# Patient Record
Sex: FEMALE | Race: WHITE | NOT HISPANIC OR LATINO | Employment: UNEMPLOYED | URBAN - METROPOLITAN AREA
[De-identification: names, ages, dates, MRNs, and addresses within clinical notes are randomized per-mention and may not be internally consistent; named-entity substitution may affect disease eponyms.]

---

## 2017-01-02 ENCOUNTER — HISTORICAL (OUTPATIENT)
Dept: ADMINISTRATIVE | Facility: HOSPITAL | Age: 82
End: 2017-01-02

## 2017-01-02 LAB
ALBUMIN SERPL-MCNC: 2.5 G/DL (ref 3.1–4.7)
ALP SERPL-CCNC: 105 IU/L (ref 40–104)
ALT (SGPT): 29 IU/L (ref 3–33)
AST SERPL-CCNC: 21 IU/L (ref 10–40)
BILIRUB SERPL-MCNC: 1.2 MG/DL (ref 0.3–1)
BUN SERPL-MCNC: 17 MG/DL (ref 8–20)
CALCIUM SERPL-MCNC: 8.6 MG/DL (ref 7.7–10.4)
CHLORIDE: 99 MMOL/L (ref 98–110)
CO2 SERPL-SCNC: 23.7 MMOL/L (ref 22.8–31.6)
CREATININE: 1.03 MG/DL (ref 0.6–1.4)
GLUCOSE: 214 MG/DL (ref 70–99)
POTASSIUM SERPL-SCNC: 3.4 MMOL/L (ref 3.5–5)
PROT SERPL-MCNC: 6 G/DL (ref 6–8.2)
SODIUM: 132 MMOL/L (ref 134–144)

## 2017-01-06 ENCOUNTER — HOSPITAL ENCOUNTER (OUTPATIENT)
Facility: HOSPITAL | Age: 82
Discharge: HOME | End: 2017-01-08
Attending: INTERNAL MEDICINE | Admitting: INTERNAL MEDICINE
Payer: MEDICARE

## 2017-01-06 DIAGNOSIS — K83.1 PANCREATITIS DUE TO BILIARY OBSTRUCTION: Primary | ICD-10-CM

## 2017-01-06 DIAGNOSIS — E83.42 HYPOMAGNESEMIA: ICD-10-CM

## 2017-01-06 DIAGNOSIS — R74.01 TRANSAMINITIS: ICD-10-CM

## 2017-01-06 DIAGNOSIS — I10 ESSENTIAL HYPERTENSION: ICD-10-CM

## 2017-01-06 DIAGNOSIS — K85.90 PANCREATITIS DUE TO BILIARY OBSTRUCTION: Primary | ICD-10-CM

## 2017-01-06 DIAGNOSIS — K85.10 ACUTE BILIARY PANCREATITIS, UNSPECIFIED COMPLICATION STATUS: ICD-10-CM

## 2017-01-06 DIAGNOSIS — R10.9 ABDOMINAL PAIN, UNSPECIFIED LOCATION: ICD-10-CM

## 2017-01-06 DIAGNOSIS — N18.2 CKD (CHRONIC KIDNEY DISEASE) STAGE 2, GFR 60-89 ML/MIN: ICD-10-CM

## 2017-01-06 DIAGNOSIS — R17 TOTAL BILIRUBIN, ELEVATED: ICD-10-CM

## 2017-01-06 DIAGNOSIS — E83.39 HYPOPHOSPHATEMIA: ICD-10-CM

## 2017-01-06 DIAGNOSIS — D50.0 IRON DEFICIENCY ANEMIA DUE TO CHRONIC BLOOD LOSS: ICD-10-CM

## 2017-01-06 DIAGNOSIS — I25.10 CORONARY ARTERY DISEASE, ANGINA PRESENCE UNSPECIFIED, UNSPECIFIED VESSEL OR LESION TYPE, UNSPECIFIED WHETHER NATIVE OR TRANSPLANTED HEART: ICD-10-CM

## 2017-01-06 DIAGNOSIS — I50.9 HEART FAILURE OF UNKNOWN TYPE: ICD-10-CM

## 2017-01-06 DIAGNOSIS — J44.9 CHRONIC OBSTRUCTIVE PULMONARY DISEASE, UNSPECIFIED COPD TYPE: ICD-10-CM

## 2017-01-06 DIAGNOSIS — D64.9 NORMOCYTIC ANEMIA: ICD-10-CM

## 2017-01-06 PROBLEM — Z95.810 AICD (AUTOMATIC CARDIOVERTER/DEFIBRILLATOR) PRESENT: Status: ACTIVE | Noted: 2017-01-06

## 2017-01-06 PROBLEM — R32 URINARY INCONTINENCE: Status: ACTIVE | Noted: 2017-01-06

## 2017-01-06 PROBLEM — R19.7 DIARRHEA: Status: ACTIVE | Noted: 2017-01-06

## 2017-01-06 PROBLEM — K22.5 ZENKER DIVERTICULUM: Status: ACTIVE | Noted: 2017-01-06

## 2017-01-06 PROBLEM — D72.829 LEUKOCYTOSIS: Status: ACTIVE | Noted: 2017-01-06

## 2017-01-06 PROBLEM — I34.0 MITRAL REGURGITATION: Status: ACTIVE | Noted: 2017-01-06

## 2017-01-06 PROBLEM — N36.1 URETHRAL DIVERTICULUM: Status: ACTIVE | Noted: 2017-01-06

## 2017-01-06 PROBLEM — Z98.890 H/O COLONOSCOPY WITH POLYPECTOMY: Status: ACTIVE | Noted: 2017-01-06

## 2017-01-06 PROBLEM — D50.9 IDA (IRON DEFICIENCY ANEMIA): Status: ACTIVE | Noted: 2017-01-06

## 2017-01-06 PROBLEM — Z86.010 H/O COLONOSCOPY WITH POLYPECTOMY: Status: ACTIVE | Noted: 2017-01-06

## 2017-01-06 PROBLEM — I42.9 CARDIOMYOPATHY: Status: ACTIVE | Noted: 2017-01-06

## 2017-01-06 PROBLEM — D75.839 THROMBOCYTOSIS: Status: ACTIVE | Noted: 2017-01-06

## 2017-01-06 PROBLEM — K59.00 CONSTIPATION: Status: ACTIVE | Noted: 2017-01-06

## 2017-01-06 PROBLEM — R32 URINARY INCONTINENCE: Status: RESOLVED | Noted: 2017-01-06 | Resolved: 2017-01-06

## 2017-01-06 PROBLEM — J45.909 ASTHMA: Status: ACTIVE | Noted: 2017-01-06

## 2017-01-06 PROBLEM — H40.9 GLAUCOMA: Status: ACTIVE | Noted: 2017-01-06

## 2017-01-06 PROBLEM — R73.9 HYPERGLYCEMIA: Status: ACTIVE | Noted: 2017-01-06

## 2017-01-06 PROBLEM — I44.7 LBBB (LEFT BUNDLE BRANCH BLOCK): Status: ACTIVE | Noted: 2017-01-06

## 2017-01-06 LAB
ALBUMIN SERPL BCP-MCNC: 2.4 G/DL
ALP SERPL-CCNC: 330 U/L
ALT SERPL W/O P-5'-P-CCNC: 135 U/L
ANION GAP SERPL CALC-SCNC: 10 MMOL/L
ANISOCYTOSIS BLD QL SMEAR: SLIGHT
AST SERPL-CCNC: 116 U/L
BASOPHILS # BLD AUTO: ABNORMAL K/UL
BASOPHILS NFR BLD: 0 %
BILIRUB SERPL-MCNC: 5.5 MG/DL
BNP SERPL-MCNC: 139 PG/ML
BUN SERPL-MCNC: 13 MG/DL
CALCIUM SERPL-MCNC: 9.4 MG/DL
CHLORIDE SERPL-SCNC: 98 MMOL/L
CO2 SERPL-SCNC: 27 MMOL/L
CREAT SERPL-MCNC: 1 MG/DL
DIFFERENTIAL METHOD: ABNORMAL
EOSINOPHIL # BLD AUTO: ABNORMAL K/UL
EOSINOPHIL NFR BLD: 0 %
ERYTHROCYTE [DISTWIDTH] IN BLOOD BY AUTOMATED COUNT: 18.6 %
EST. GFR  (AFRICAN AMERICAN): 59 ML/MIN/1.73 M^2
EST. GFR  (NON AFRICAN AMERICAN): 51 ML/MIN/1.73 M^2
GLUCOSE SERPL-MCNC: 303 MG/DL
HCT VFR BLD AUTO: 32 %
HGB BLD-MCNC: 10.5 G/DL
HYPOCHROMIA BLD QL SMEAR: ABNORMAL
INR PPP: 1.1
LYMPHOCYTES # BLD AUTO: ABNORMAL K/UL
LYMPHOCYTES NFR BLD: 4 %
MAGNESIUM SERPL-MCNC: 1.5 MG/DL
MCH RBC QN AUTO: 27.1 PG
MCHC RBC AUTO-ENTMCNC: 32.8 %
MCV RBC AUTO: 83 FL
MONOCYTES # BLD AUTO: ABNORMAL K/UL
MONOCYTES NFR BLD: 2 %
NEUTROPHILS NFR BLD: 94 %
PHOSPHATE SERPL-MCNC: 2.7 MG/DL
PLATELET # BLD AUTO: 417 K/UL
PLATELET BLD QL SMEAR: ABNORMAL
PMV BLD AUTO: 9.8 FL
POTASSIUM SERPL-SCNC: 3.6 MMOL/L
PROT SERPL-MCNC: 6.4 G/DL
PROTHROMBIN TIME: 11.4 SEC
RBC # BLD AUTO: 3.87 M/UL
SODIUM SERPL-SCNC: 135 MMOL/L
TROPONIN I SERPL DL<=0.01 NG/ML-MCNC: 0.05 NG/ML
WBC # BLD AUTO: 14.66 K/UL

## 2017-01-06 PROCEDURE — 82607 VITAMIN B-12: CPT

## 2017-01-06 PROCEDURE — 85007 BL SMEAR W/DIFF WBC COUNT: CPT

## 2017-01-06 PROCEDURE — 83540 ASSAY OF IRON: CPT

## 2017-01-06 PROCEDURE — 80053 COMPREHEN METABOLIC PANEL: CPT

## 2017-01-06 PROCEDURE — 85027 COMPLETE CBC AUTOMATED: CPT

## 2017-01-06 PROCEDURE — 25000003 PHARM REV CODE 250: Performed by: HOSPITALIST

## 2017-01-06 PROCEDURE — G0378 HOSPITAL OBSERVATION PER HR: HCPCS

## 2017-01-06 PROCEDURE — 83735 ASSAY OF MAGNESIUM: CPT

## 2017-01-06 PROCEDURE — 85610 PROTHROMBIN TIME: CPT

## 2017-01-06 PROCEDURE — 84466 ASSAY OF TRANSFERRIN: CPT

## 2017-01-06 PROCEDURE — 84100 ASSAY OF PHOSPHORUS: CPT

## 2017-01-06 PROCEDURE — 84484 ASSAY OF TROPONIN QUANT: CPT

## 2017-01-06 PROCEDURE — 83880 ASSAY OF NATRIURETIC PEPTIDE: CPT

## 2017-01-06 PROCEDURE — 82746 ASSAY OF FOLIC ACID SERUM: CPT

## 2017-01-06 PROCEDURE — 82728 ASSAY OF FERRITIN: CPT

## 2017-01-06 PROCEDURE — G0379 DIRECT REFER HOSPITAL OBSERV: HCPCS

## 2017-01-06 PROCEDURE — 36415 COLL VENOUS BLD VENIPUNCTURE: CPT

## 2017-01-06 PROCEDURE — 25000003 PHARM REV CODE 250

## 2017-01-06 PROCEDURE — 93005 ELECTROCARDIOGRAM TRACING: CPT

## 2017-01-06 RX ORDER — LATANOPROST 50 UG/ML
1 SOLUTION/ DROPS OPHTHALMIC NIGHTLY
Status: DISCONTINUED | OUTPATIENT
Start: 2017-01-06 | End: 2017-01-08 | Stop reason: HOSPADM

## 2017-01-06 RX ORDER — HEPARIN SODIUM 5000 [USP'U]/ML
5000 INJECTION, SOLUTION INTRAVENOUS; SUBCUTANEOUS EVERY 12 HOURS
Status: DISCONTINUED | OUTPATIENT
Start: 2017-01-06 | End: 2017-01-07

## 2017-01-06 RX ORDER — AMLODIPINE BESYLATE 5 MG/1
5 TABLET ORAL DAILY
Status: DISCONTINUED | OUTPATIENT
Start: 2017-01-07 | End: 2017-01-06

## 2017-01-06 RX ORDER — LATANOPROST 50 UG/ML
1 SOLUTION/ DROPS OPHTHALMIC NIGHTLY
COMMUNITY

## 2017-01-06 RX ORDER — UBIDECARENONE 30 MG
100 CAPSULE ORAL DAILY
Status: ON HOLD | COMMUNITY
End: 2019-02-06 | Stop reason: ALTCHOICE

## 2017-01-06 RX ORDER — OXYBUTYNIN CHLORIDE 5 MG/1
5 TABLET, EXTENDED RELEASE ORAL DAILY
Status: DISCONTINUED | OUTPATIENT
Start: 2017-01-07 | End: 2017-01-08 | Stop reason: HOSPADM

## 2017-01-06 RX ORDER — AMLODIPINE BESYLATE 5 MG/1
5 TABLET ORAL DAILY
Status: DISCONTINUED | OUTPATIENT
Start: 2017-01-06 | End: 2017-01-08 | Stop reason: HOSPADM

## 2017-01-06 RX ORDER — CHOLECALCIFEROL (VITAMIN D3) 10 MCG
2000 TABLET ORAL DAILY
Status: DISCONTINUED | OUTPATIENT
Start: 2017-01-06 | End: 2017-01-07

## 2017-01-06 RX ORDER — ERGOCALCIFEROL 1.25 MG/1
50000 CAPSULE ORAL
COMMUNITY

## 2017-01-06 RX ORDER — TALC
3 POWDER (GRAM) TOPICAL NIGHTLY
Status: ON HOLD | COMMUNITY
End: 2019-02-06 | Stop reason: CLARIF

## 2017-01-06 RX ORDER — TAMSULOSIN HYDROCHLORIDE 0.4 MG/1
0.4 CAPSULE ORAL DAILY
Status: ON HOLD | COMMUNITY
End: 2019-02-08 | Stop reason: HOSPADM

## 2017-01-06 RX ORDER — FAMOTIDINE 20 MG/1
20 TABLET, FILM COATED ORAL 2 TIMES DAILY
Status: DISCONTINUED | OUTPATIENT
Start: 2017-01-06 | End: 2017-01-08 | Stop reason: HOSPADM

## 2017-01-06 RX ORDER — ACETAMINOPHEN 500 MG
2000 TABLET ORAL DAILY
COMMUNITY

## 2017-01-06 RX ORDER — RAMIPRIL 2.5 MG/1
5 CAPSULE ORAL DAILY
Status: DISCONTINUED | OUTPATIENT
Start: 2017-01-07 | End: 2017-01-06

## 2017-01-06 RX ORDER — NALBUPHINE HYDROCHLORIDE 20 MG/ML
5 INJECTION, SOLUTION INTRAMUSCULAR; INTRAVENOUS; SUBCUTANEOUS
Status: DISCONTINUED | OUTPATIENT
Start: 2017-01-06 | End: 2017-01-08 | Stop reason: HOSPADM

## 2017-01-06 RX ORDER — TAMSULOSIN HYDROCHLORIDE 0.4 MG/1
0.4 CAPSULE ORAL DAILY
Status: DISCONTINUED | OUTPATIENT
Start: 2017-01-06 | End: 2017-01-07

## 2017-01-06 RX ORDER — FAMOTIDINE 20 MG/1
20 TABLET, FILM COATED ORAL 2 TIMES DAILY
COMMUNITY

## 2017-01-06 RX ORDER — CETIRIZINE HYDROCHLORIDE 5 MG/1
5 TABLET ORAL DAILY
Status: DISCONTINUED | OUTPATIENT
Start: 2017-01-07 | End: 2017-01-08 | Stop reason: HOSPADM

## 2017-01-06 RX ORDER — GLUCAGON 1 MG
1 KIT INJECTION
Status: DISCONTINUED | OUTPATIENT
Start: 2017-01-07 | End: 2017-01-08 | Stop reason: HOSPADM

## 2017-01-06 RX ORDER — BRINZOLAMIDE 10 MG/ML
1 SUSPENSION/ DROPS OPHTHALMIC 2 TIMES DAILY
COMMUNITY

## 2017-01-06 RX ORDER — RAMIPRIL 2.5 MG/1
2.5 CAPSULE ORAL
COMMUNITY

## 2017-01-06 RX ORDER — MAGNESIUM SULFATE HEPTAHYDRATE 40 MG/ML
2 INJECTION, SOLUTION INTRAVENOUS ONCE
Status: COMPLETED | OUTPATIENT
Start: 2017-01-06 | End: 2017-01-07

## 2017-01-06 RX ORDER — RAMIPRIL 2.5 MG/1
5 CAPSULE ORAL DAILY
Status: DISCONTINUED | OUTPATIENT
Start: 2017-01-06 | End: 2017-01-07

## 2017-01-06 RX ORDER — INSULIN ASPART 100 [IU]/ML
0-5 INJECTION, SOLUTION INTRAVENOUS; SUBCUTANEOUS EVERY 6 HOURS PRN
Status: DISCONTINUED | OUTPATIENT
Start: 2017-01-07 | End: 2017-01-08 | Stop reason: HOSPADM

## 2017-01-06 RX ORDER — BRINZOLAMIDE 10 MG/ML
1 SUSPENSION/ DROPS OPHTHALMIC 2 TIMES DAILY
Status: DISCONTINUED | OUTPATIENT
Start: 2017-01-06 | End: 2017-01-08 | Stop reason: HOSPADM

## 2017-01-06 RX ORDER — DOCUSATE SODIUM 100 MG/1
100 CAPSULE, LIQUID FILLED ORAL 2 TIMES DAILY
Status: ON HOLD | COMMUNITY
End: 2019-02-06

## 2017-01-06 RX ORDER — TAMSULOSIN HYDROCHLORIDE 0.4 MG/1
0.4 CAPSULE ORAL DAILY
Status: DISCONTINUED | OUTPATIENT
Start: 2017-01-07 | End: 2017-01-06

## 2017-01-06 RX ORDER — CHOLECALCIFEROL (VITAMIN D3) 10 MCG
2000 TABLET ORAL DAILY
Status: DISCONTINUED | OUTPATIENT
Start: 2017-01-07 | End: 2017-01-06

## 2017-01-06 RX ORDER — QUETIAPINE FUMARATE 100 MG/1
100 TABLET, FILM COATED ORAL NIGHTLY
Status: DISCONTINUED | OUTPATIENT
Start: 2017-01-06 | End: 2017-01-08 | Stop reason: HOSPADM

## 2017-01-06 RX ORDER — OXYBUTYNIN CHLORIDE 5 MG/1
5 TABLET, EXTENDED RELEASE ORAL DAILY
Status: ON HOLD | COMMUNITY
End: 2019-02-06 | Stop reason: CLARIF

## 2017-01-06 RX ORDER — LANOLIN ALCOHOL/MO/W.PET/CERES
400 CREAM (GRAM) TOPICAL DAILY
Status: ON HOLD | COMMUNITY
End: 2019-02-06 | Stop reason: ALTCHOICE

## 2017-01-06 RX ORDER — QUETIAPINE FUMARATE 100 MG/1
100 TABLET, FILM COATED ORAL NIGHTLY
Status: ON HOLD | COMMUNITY
End: 2019-02-08 | Stop reason: HOSPADM

## 2017-01-06 RX ORDER — LABETALOL HYDROCHLORIDE 5 MG/ML
10 INJECTION, SOLUTION INTRAVENOUS EVERY 4 HOURS PRN
Status: DISCONTINUED | OUTPATIENT
Start: 2017-01-06 | End: 2017-01-08 | Stop reason: HOSPADM

## 2017-01-06 RX ADMIN — HEPARIN SODIUM 5000 UNITS: 5000 INJECTION, SOLUTION INTRAVENOUS; SUBCUTANEOUS at 08:01

## 2017-01-06 RX ADMIN — AMLODIPINE BESYLATE 5 MG: 5 TABLET ORAL at 08:01

## 2017-01-06 RX ADMIN — FAMOTIDINE 20 MG: 20 TABLET, FILM COATED ORAL at 08:01

## 2017-01-06 RX ADMIN — CHOLECALCIFEROL TAB 10 MCG (400 UNIT) 2000 UNITS: 10 TAB at 08:01

## 2017-01-06 RX ADMIN — TAMSULOSIN HYDROCHLORIDE 0.4 MG: 0.4 CAPSULE ORAL at 08:01

## 2017-01-06 RX ADMIN — QUETIAPINE FUMARATE 100 MG: 100 TABLET, FILM COATED ORAL at 08:01

## 2017-01-06 RX ADMIN — RAMIPRIL 5 MG: 2.5 CAPSULE ORAL at 08:01

## 2017-01-06 NOTE — IP AVS SNAPSHOT
[facility]  [address]  [place] [address]  Phone: [phone]           I have received a copy of my After Visit Summary and discharge instructions from [facility].    INSTRUCTIONS RECEIVED AND UNDERSTOOD BY:                     Patient/Patient Representative: ________________________________________________________________     Date/Time: ________________________________________________________________                     Instructions Given By: ________________________________________________________________     Date/Time: ________________________________________________________________

## 2017-01-06 NOTE — IP AVS SNAPSHOT
[facility]  [address]  [place] [address]  Phone: [phone]           Patient Discharge Instructions     Our goal is to set you up for success. This packet includes information on your condition, medications, and your home care. It will help you to care for yourself so you don't get sicker and need to go back to the hospital.     Please ask your nurse if you have any questions.        There are many details to remember when preparing to leave the hospital. Here is what you will need to do:    1. Take your medicine. If you are prescribed medications, review your Medication List in the following pages. You may have new medications to  at the pharmacy and others that you'll need to stop taking. Review the instructions for how and when to take your medications. Talk with your doctor or nurses if you are unsure of what to do.     2. Go to your follow-up appointments. Specific follow-up information is listed in the following pages. Your may be contacted by a transition nurse or clinical provider about future appointments. Be sure we have all of the phone numbers to reach you, if needed. Please contact your provider's office if you are unable to make an appointment.     3. Watch for warning signs. Your doctor or nurse will give you detailed warning signs to watch for and when to call for assistance. These instructions may also include educational information about your condition. If you experience any of warning signs to your health, call your doctor.               ** Verify the list of medication(s) below is accurate and up to date. Carry this with you in case of emergency. If your medications have changed, please notify your healthcare provider.             Medication List      CONTINUE taking these medications        Additional Info                      ALLEGRA ORAL   Refills:  0   Dose:  180 mg    Instructions:  Take 180 mg by mouth once daily.     Begin Date    AM    Noon    PM     Bedtime       brinzolamide 1 % ophthalmic suspension   Commonly known as:  AZOPT   Refills:  0   Dose:  1 drop    Last time this was given:  1 drop on 1/8/2017  8:33 AM   Instructions:  Place 1 drop into both eyes 2 (two) times daily.     Begin Date    AM    Noon    PM    Bedtime       co-enzyme Q-10 30 mg capsule   Refills:  0   Dose:  100 mg    Instructions:  Take 100 mg by mouth once daily. Up to 500mg     Begin Date    AM    Noon    PM    Bedtime       docusate sodium 100 MG capsule   Commonly known as:  COLACE   Refills:  0   Dose:  100 mg    Instructions:  Take 100 mg by mouth 2 (two) times daily.     Begin Date    AM    Noon    PM    Bedtime       famotidine 20 MG tablet   Commonly known as:  PEPCID   Refills:  0   Dose:  20 mg    Last time this was given:  20 mg on 1/8/2017  8:34 AM   Instructions:  Take 20 mg by mouth 2 (two) times daily.     Begin Date    AM    Noon    PM    Bedtime       furosemide 20 MG tablet   Commonly known as:  LASIX   Refills:  0   Dose:  20 mg    Instructions:  Take 20 mg by mouth once daily. 1 Tablet Oral Every other day     Begin Date    AM    Noon    PM    Bedtime       latanoprost 0.005 % ophthalmic solution   Refills:  0   Dose:  1 drop    Last time this was given:  1 drop on 1/7/2017  9:24 PM   Instructions:  Place 1 drop into both eyes every evening.     Begin Date    AM    Noon    PM    Bedtime       magnesium oxide 400 mg tablet   Commonly known as:  MAG-OX   Refills:  0   Dose:  400 mg    Instructions:  Take 400 mg by mouth once daily.     Begin Date    AM    Noon    PM    Bedtime       melatonin 3 mg Tab   Refills:  0   Dose:  3 mg    Instructions:  Take 3 mg by mouth every evening.     Begin Date    AM    Noon    PM    Bedtime       NORVASC ORAL   Refills:  0   Dose:  5 mg    Last time this was given:  5 mg on 1/8/2017  8:34 AM   Instructions:  Take 5 mg by mouth once daily.     Begin Date    AM    Noon    PM    Bedtime       oxybutynin 5 MG Tr24   Commonly known as:   DITROPAN-XL   Refills:  0   Dose:  5 mg    Last time this was given:  5 mg on 1/7/2017  8:22 AM   Instructions:  Take 5 mg by mouth once daily.     Begin Date    AM    Noon    PM    Bedtime       quetiapine 100 MG Tab   Commonly known as:  SEROQUEL   Refills:  0   Dose:  100 mg    Last time this was given:  100 mg on 1/7/2017  9:23 PM   Instructions:  Take 100 mg by mouth every evening.     Begin Date    AM    Noon    PM    Bedtime       ramipril 5 MG capsule   Commonly known as:  ALTACE   Refills:  0   Dose:  5 mg    Last time this was given:  5 mg on 1/6/2017  8:43 PM   Instructions:  Take 5 mg by mouth once daily.     Begin Date    AM    Noon    PM    Bedtime       tamsulosin 0.4 mg Cp24   Commonly known as:  FLOMAX   Refills:  0   Dose:  0.4 mg    Last time this was given:  0.4 mg on 1/6/2017  8:42 PM   Instructions:  Take 0.4 mg by mouth once daily.     Begin Date    AM    Noon    PM    Bedtime       VITAMIN D2 50,000 unit Cap   Refills:  0   Dose:  83569 Units   Generic drug:  ergocalciferol    Instructions:  Take 50,000 Units by mouth every 14 (fourteen) days.     Begin Date    AM    Noon    PM    Bedtime       VITAMIN D3 2,000 unit Cap   Refills:  0   Dose:  2000 Units   Generic drug:  cholecalciferol (vitamin D3)    Last time this was given:  2,000 Units on 1/7/2017  9:24 PM   Instructions:  Take 2,000 Units by mouth once daily.     Begin Date    AM    Noon    PM    Bedtime                  Please bring to all follow up appointments:    1. A copy of your discharge instructions.  2. All medicines you are currently taking in their original bottles.  3. Identification and insurance card.    Please arrive 15 minutes ahead of scheduled appointment time.    Please call 24 hours in advance if you must reschedule your appointment and/or time.        Follow-up Information     Follow up with Roc Ley MD In 2 weeks.    Specialty:  Internal Medicine    Contact information:    [address] [place]  [address]  [phone]          Follow up with Akash Syed MD In 1 week.    Specialty:  Gastroenterology    Contact information:    [address]  [place] [address]  [phone]        Referrals     Future Orders    Ambulatory referral to Outpatient Case Management     Questions:    Does the patient have a chronic or uncontrolled disease process?:      Does the patient have a new diagnosis of a catastrophic or life altering illness/treatment?:      Does the patient have any psycho-social issues that may affect their ability to adhere to treatment plan?:      Does patient have any behaviors or circumstances that may impede ability to adhere to treatment plan?:      Is patient at risk for admission/readmission?:  Yes        Discharge Instructions     Future Orders    Diet Cardiac     Diet Diabetic 1800 Calories         Primary Diagnosis     Your primary diagnosis was:  Pancreatitis Due To Biliary Obstruction      Admission Information     Date & Time Provider Department CSN    1/6/2017  6:32 PM Uziel Delaney MD [facility] [id number]      Care Providers     Provider Role Specialty Primary office phone    Uziel Delaney MD Attending Provider Internal Medicine [phone]    Akash Syed MD Consulting Physician  Gastroenterology [phone]    Dariana Andrew MD Consulting Physician  Anesthesiology [phone]    Eric Blankenship MD Consulting Physician  Gastroenterology  [phone]    Eric Blankenship MD Surgeon  Gastroenterology [phone]      Your Vitals Were     BP Pulse Temp Resp Height Weight    162/69 (BP Method: Automatic) 69 97.1 °F (36.2 °C) (Oral) 16 5' (1.524 m) 67.4 kg (148 lb 8 oz)    SpO2 BMI             94% 29 kg/m2         Recent Lab Values        1/7/2017                           5:58 AM           A1C 7.6 (H)           Comment for A1C at  5:58 AM on 1/7/2017:  According to ADA guidelines, hemoglobin A1C <7.0% represents  optimal control in non-pregnant  diabetic patients.  Different  metrics may apply to specific populations.   Standards of Medical Care in Diabetes - 2016.  For the purpose of screening for the presence of diabetes:  <5.7%     Consistent with the absence of diabetes  5.7-6.4%  Consistent with increasing risk for diabetes   (prediabetes)  >or=6.5%  Consistent with diabetes  Currently no consensus exists for use of hemoglobin A1C  for diagnosis of diabetes for children.        Pending Labs     Order Current Status    Comprehensive metabolic panel In process      Allergies as of 1/8/2017        Reactions    Adhesive Other (See Comments)    Dilaudid [Hydromorphone] Hallucinations    Elavil [Amitriptyline] Other (See Comments)    Morphine Other (See Comments)    hallucinations    Prednisone     Other reaction(s): Unknown      [facility]     [facility] Nurse Care Line - 24/7 Assistance  Unless otherwise directed by your provider, please contact [facility], our nurse care line that is available for 24/7 assistance.     Registered nurses in the [facility] provide clinical advisement, health education, appointment booking, and other advisory services.  Call for this free service at [phone].        Advance Directives     An advance directive is a document which, in the event you are no longer able to make decisions for yourself, tells your healthcare team what kind of treatment you do or do not want to receive, or who you would like to make those decisions for you.  If you do not currently have an advance directive, [facility] encourages you to create one.  For more information call:  [phone]), [phone] ([phone]),  or log on to [email or web address].        Language Assistance Services     ATTENTION: Language assistance services are available, free of charge. Please call [phone].      ATENCIÓN: Si habla español, tiene a garcia disposición servicios gratuitos de asistencia lingüística. Llame al  [phone].     [facility] Ý: N?u b?n nói Ti?ng Vi?t, có các d?ch v? h? tr? ngôn ng? mi?n phí dành cho b?n. G?i s? [phone].        Heart Failure Education       Heart Failure: Being Active  You have a condition called heart failure. Being active doesnt mean that you have to wear yourself out. Even a little movement each day helps to strengthen your heart. If you cant get out to exercise, you can do simple stretching and strengthening exercises at home. These are good ways to keep you well-conditioned and prevent you and your heart from becoming excessively weak.    Ideas to get you started  · Add a little movement to things you do now. Walk to mail letters. Park your car at the far end of the parking lot and walk to the store. Walk up a flight of stairs instead of taking the elevator.  · Choose activities you enjoy. You might walk, swim, or ride an exercise bike. Things like gardening and washing the car count, too. Other possibilities include: washing dishes, walking the dog, walking around the mall, and doing aerobic activities with friends.  · Join a group exercise program at a [facility] or [facility], a senior center, or a community center. Or look into a hospital cardiac rehabilitation program. Ask your doctor if you qualify.  Tips to keep you going  · Get up and get dressed each day. Go to a coffee shop and read a newspaper or go somewhere that you'll be in the presence of other active people. Youll feel more like being active.  · Make a plan. Choose one or more activities that you enjoy and that you can easily do. Then plan to do at least one each day. You might write your plan on a calendar.  · Go with a friend or a group if you like company. This can help you feel supported and stay motivated, too.  · Plan social events that you enjoy. This will keep you mentally engaged as well as physically motivated to do things you find pleasure in.  For your safety  · Talk with your healthcare provider before starting an  exercise program.  · Exercise indoors when its too hot or too cold outside, or when the air quality is poor. Try walking at a shopping mall.  · Wear socks and sturdy shoes to maintain your balance and prevent falls.  · Start slowly. Do a few minutes several times a day at first. Increase your time and speed little by little.  · Stop and rest whenever you feel tired or get short of breath.  · Dont push yourself on days when you dont feel well.  © 6594-6668 [employer]. [address] [place] [address]. All rights reserved. This information is not intended as a substitute for professional medical care. Always follow your healthcare professional's instructions.              Heart Failure: Evaluating Your Heart  You have a condition called heart failure. To evaluate your condition, your doctor will examine you, ask questions, and do some tests. Along with looking for signs of heart failure, the doctor looks for any other health problems that may have led to heart failure. The results of your evaluation will help your doctor form a treatment plan.  Health history and physical exam  Your visit will start with a health history. Tell the doctor about any symptoms youve noticed and about all medicines you take. Then youll have a physical exam. This includes listening to your heartbeat and breathing. Youll also be checked for swelling (edema) in your legs and neck. When you have fluid buildup or fluid in the lungs, it may be called congestive heart failure.  Diagnosing heart failure     During an echocardiogram, sound waves bounce off the heart. These are converted into a picture on the screen.   The following may be done to help your doctor form a diagnosis:  · X-rays show the size and shape of your heart. These pictures can also show fluid in your lungs.  · An electrocardiogram (ECG or EKG) shows the pattern of your heartbeat. Small pads (electrodes) are placed on your chest, arms, and legs.  Wires connect the pads to the ECG machine, which records your hearts electrical signals. This can give the doctor information about heart function.  · An echocardiogram uses ultrasound waves to show the structure and movement of your heart muscle. This shows how well the heart pumps. It also shows the thickness of the heart walls, and if the heart is enlarged. It is one of the most useful, non-invasive tests as it provides information about the heart's general function. This helps your doctor make treatment decisions.  · Lab tests evaluate small amounts of blood or urine for signs of problems. A BNP lab test can help diagnose and evaluate heart failure. BNP stands for B-type natriuretic peptide. The ventricles secrete more BNP when heart failure worsens. Lab tests can also provide information about metabolic dysfunction or heart dysfunction.  Your treatment plan  Based on the results of your evaluation and tests, your doctor will develop a treatment plan. This plan is designed to relieve some of your heart failure symptoms and help make you more comfortable. Your treatment plan may include:  · Medicine to help your heart work better and improve your quality of life  · Changes in what you eat and drink to help prevent fluid from backing up in your body  · Daily monitoring of your weight and heart failure symptoms to see how well your treatment plan is working  · Exercise to help you stay healthy  · Help with quitting smoking  · Emotional and psychological support to help adjust to the changes  · Referrals to other specialists to make sure you are being treated comprehensively  © 1960-6555 [employer]. [address] [place] [address], [place] [address]. All rights reserved. This information is not intended as a substitute for professional medical care. Always follow your healthcare professional's instructions.              Heart Failure: Making Changes to Your Diet  You have a condition called heart failure.  When you have heart failure, excess fluid is more likely to build up in your body because your heart isn't working well. This makes the heart work harder to pump blood. Fluid buildup causes symptoms such as shortness of breath and swelling (edema). This is often referred to as congestive heart failure or CHF. Controlling the amount of salt (sodium) you eat may help stop fluid from building up. Your doctor may also tell you to reduce the amount of fluid you drink.  Reading food labels    Your healthcare provider will tell you how much sodium you can eat each day. Read food labels to keep track. Keep in mind that certain foods are high in salt. These include canned, frozen, and processed foods. Check the amount of sodium in each serving. Watch out for high-sodium ingredients. These include MSG (monosodium glutamate), baking soda, and sodium phosphate.   Eating less salt  Give yourself time to get used to eating less salt. It may take a little while. Here are some tips to help:  · Take the saltshaker off the table. Replace it with salt-free herb mixes and spices.  · Eat fresh or plain frozen vegetables. These have much less salt than canned vegetables.  · Choose low-sodium snacks like sodium-free pretzels, crackers, or air-popped popcorn.  · Dont add salt to your food when youre cooking. Instead, season your foods with pepper, lemon, garlic, or onion.  · When you eat out, ask that your food be cooked without added salt.  · Avoid eating fried foods as these often have a great deal of salt.  If youre told to limit fluids  You may need to limit how much fluid you have to help prevent swelling. This includes anything that is liquid at room temperature, such as ice cream and soup. If your doctor tells you to limit fluid, try these tips:  · Measure drinks in a measuring cup before you drink them. This will help you meet daily goals.  · Chill drinks to make them more refreshing.  · Suck on frozen lemon wedges to quench  thirst.  · Only drink when youre thirsty.  · Chew sugarless gum or suck on hard candy to keep your mouth moist.  · Weigh yourself daily to know if your body's fluid content is rising.  My sodium goal  Your healthcare provider may give you a sodium goal to meet each day. This includes sodium found in food as well as salt that you add. My goal is to eat no more than ___________ mg of sodium per day.     When to call your doctor  Call your doctor right away if you have any symptoms of worsening heart failure. These can include:  · Sudden weight gain  · Increased swelling of your legs or ankles  · Trouble breathing when youre resting or at night  · Increase in the number of pillows you have to sleep on  · Chest pain, pressure, discomfort, or pain in the jaw, neck, or back   © 1383-5726 [employer]. [address] [place] [address]. All rights reserved. This information is not intended as a substitute for professional medical care. Always follow your healthcare professional's instructions.              Heart Failure: Medicines to Help Your Heart    You have a condition called heart failure (also known as congestive heart failure, or CHF). Your doctor will likely prescribe medicines for heart failure and any underlying health problems you have. Most heart failure patients take one or more types of medicinen. Your healthcare provider will work to find the combination of medicines that works best for you.  Heart failure medicines  Here are the most common heart failure medicines:  · ACE inhibitors lower blood pressure and decrease strain on the heart. This makes it easier for the heart to pump. Angiotensin receptor blockers have similar effects. These are prescribed for some patients instead of ACE inhibitors.  · Beta-blockers relieve stress on the heart. They also improve symptoms. They may also improve the heart's pumping action over time.  · Diuretics (also called water pills) help rid your  body of excess water. This can help rid your body of swelling (edema). Having less fluid to pump means your heart doesnt have to work as hard. Some diuretics make your body lose a mineral called potassium. Your doctor will tell you if you need to take supplements or eat more foods high in potassium.  · Digoxin helps your heart pump with more strength. This helps your heart pump more blood with each beat. So, more oxygen-rich blood travels to the rest of the body.  · Aldosterone antagonists help alter hormones and decrease strain on the heart.  · Hydralazine and nitrates are two separate medicines used together to treat heart failure. They may come in one combination pill. They lower blood pressure and decrease how hard the heart has to pump.  Medicines for related conditions  Controlling other heart problems helps keep heart failure under control, too. Depending on other heart problems you have, medicines may be prescribed to:  · Lower blood pressure (antihypertensives).  · Lower cholesterol levels (statins).  · Prevent blood clots (anticoagulants or aspirin).  · Keep the heartbeat steady (antiarrhythmics).  © 1128-2951 [employer]. [address] [place] [address]. All rights reserved. This information is not intended as a substitute for professional medical care. Always follow your healthcare professional's instructions.              Heart Failure: Procedures That May Help    The heart is a muscle that pumps oxygen-rich blood to all parts of the body. When you have heart failure, the heart is not able to pump as well as it should. Blood and fluid may back up into the lungs (congestive heart failure), and some parts of the body dont get enough oxygen-rich blood to work normally. These problems lead to the symptoms of heart failure.     Certain procedures may help the heart pump better in some cases of heart failure. Some procedures are done to treat health problems that may have caused  the heart failure such as coronary artery disease or heart rhythm problems. For more serious heart failure, other options are available.  Treating artery and valve problems  If you have coronary artery disease or valve disease, procedures may be done to improve blood flow. This helps the heart pump better, which can improve heart failure symptoms. First, your doctor may do a cardiac catheterization to help detect clogged blood vessels or valve damage. During this procedure, a  thin tube (catheter) in inserted into a blood vessel and guided to the heart. There a dye is injected and a special type of X-ray (angiogram) is taken of the blood vessels. Procedures to open a blocked artery or fix damaged valves can also be done using catheterization.  · Angioplasty uses a balloon-tipped instrument at the end of the catheter. The balloon is inflated to widen the narrowed artery. In many cases, a stent is expanded to further support the narrowed artery. A stent is a metal mesh tube.  · Valve surgery repairs or replacement of faulty valves can also be done during catheterization so blood can flow properly through the chambers of the heart.  Bypass surgery is another option to help treat blocked arteries. It uses a healthy blood vessel from elsewhere in the body. The healthy blood vessel is attached above and below the blocked area so that blood can flow around the blocked artery.  Treating heart rhythm problems  A device may be placed in the chest to help a weak heart maintain a healthy, heartbeat so the heart can pump more effectively:  · Pacemaker. A pacemaker is an implanted device that regulates your heartbeat electronically. It monitors your heart's rhythm and generates a painless electric impulse that helps the heart beat in a regular rhythm. A pacemaker is programmed to meet your specific heart rhythm needs.  · Biventricular pacing/cardiac resynchronization therapy. A type of pacemaker that paces both pumping chambers  of the heart at the same time to coordinate contractions and to improve the heart's function. Some people with heart failure are candidates for this therapy.  · Implantable cardioverter defibrillator. A device similar to a pacemaker that senses when the heart is beating too fast and delivers an electrical shock to convert the fast rhythm to a normal rhythm. This can be a life saving device.  In severe cases  In more serious cases of heart failure when other treatments no longer work, other options may include:  · Ventricular assist devices (VADs). These are mechanical devices used to take over the pumping function for one or both of the heart's ventricles, or pumping chambers. A VAD may be necessary when heart failure progresses to the point that medicines and other treatments no longer help. In some cases, a VAD may be used as a bridge to transplant.  · Heart transplant. This is replacing the diseased heart with a healthy one from a donor. This is an option for a few people who are very sick. A heart transplant is very serious and not an option for all patients. Your doctor can tell you more.  © 3149-2718 [employer]. [address] [place] [address]. All rights reserved. This information is not intended as a substitute for professional medical care. Always follow your healthcare professional's instructions.              Heart Failure: Tracking Your Weight  You have a condition called heart failure. When you have heart failure, a sudden weight gain or a steady rise in weight is a warning sign that your body is retaining too much water and salt. This could mean your heart failure is getting worse. If left untreated, it can cause problems for your lungs and result in shortness of breath. Weighing yourself each day is the best way to know if youre retaining water. If your weight goes up quickly, call your doctor. You will be given instructions on how to get rid of the excess water. You will  likely need medicines and to avoid salt. This will help your heart work better.  Call your doctor if you gain more than 2 pounds in 1 day, more than 5 pounds in 1 week, or whatever weight gain you were told to report by your doctor. This is often a sign of worsening heart failure and needs to be evaluated and treated. Your doctor will tell you what to do next.   Tips for weighing yourself    · Weigh yourself at the same time each morning, wearing the same clothes. Weigh yourself after urinating and before eating.  · Use the same scale each day. Make sure the numbers are easy to read. Put the scale on a flat, hard surface -- not on a rug or carpet.  · Do not stop weighing yourself. If you forget one day, weigh again the next morning.  How to use your weight chart  · Keep your weight chart near the scale. Write your weight on the chart as soon as you get off the scale.  · Fill in the month and the start date on the chart. Then write down your weight each day. Your chart will look like this:    · If you miss a day, leave the space blank. Weigh yourself the next day and write your weight in the next space.  · Take your weight chart with you when you go to see your doctor.  © 5094-2983 [employer]. [address] [place] [address], [place] [address]. All rights reserved. This information is not intended as a substitute for professional medical care. Always follow your healthcare professional's instructions.              Heart Failure: Warning Signs of a Flare-Up  You have a condition called heart failure. Once you have heart failure, flare-ups can happen. Below are signs that can mean your heart failure is getting worse. If you notice any of these warning signs, call your healthcare provider.  Swelling    · Your feet, ankles, or lower legs get puffier.  · You notice skin changes on your lower legs.  · Your shoes feel too tight.  · Your clothes are tighter in the waist.  · You have trouble getting rings on or off your  fingers.  Shortness of breath  · You have to breathe harder even when youre doing your normal activities or when youre resting.  · You are short of breath walking up stairs or even short distances.  · You wake up at night short of breath or coughing.  · You need to use more pillows or sit up to sleep.  · You wake up tired or restless.  Other warning signs  · You feel weaker, dizzy, or more tired.  · You have chest pain or changes in your heartbeat.  · You have a cough that wont go away.  · You cant remember things or dont feel like eating.  Tracking your weight  Gaining weight is often the first warning sign that heart failure is getting worse. Gaining even a few pounds can be a sign that your body is retaining excess water and salt. Weighing yourself each day in the morning after you urinate and before you eat, is the best way to know if you're retaining water. Get a scale that is easy to read and make sure you wear the same clothes and use the same scale every time you weigh. Your healthcare provider will show you how to track your weight. Call your doctor if you gain more than 2 pounds in 1 day, 5 pounds in 1 week, or whatever weight gain you were told to report by your doctor. This is often a sign of worsening heart failure and needs to be evaluated and treated before it compromises your breathing. Your doctor will tell you what to do next.    © 9314-0417 [employer]. [address] [place] [address], [place] [address]. All rights reserved. This information is not intended as a substitute for professional medical care. Always follow your healthcare professional's instructions.              Chronic Kindey Disease Education             [facility] Sign-Up     Activating your [facility] account is as easy as 1-2-3!     1) Visit [email or web address], select Sign Up Now, enter this activation code and your date of birth, then select Next.  [email or web address]  Expires: 2/22/2017  8:20 AM      2) Create a username and  password to use when you visit [facility] in the future and select a security question in case you lose your password and select Next.    3) Enter your e-mail address and click Sign Up!    Additional Information  If you have questions, please e-mail [email or web address] or call [phone] to talk to our [facility] staff. Remember, [facility] is NOT to be used for urgent needs. For medical emergencies, dial 911.          [facility] complies with applicable Federal civil rights laws and does not discriminate on the basis of race, color, national origin, age, disability, or sex.

## 2017-01-07 ENCOUNTER — ANESTHESIA (OUTPATIENT)
Dept: ENDOSCOPY | Facility: HOSPITAL | Age: 82
End: 2017-01-07
Payer: MEDICARE

## 2017-01-07 ENCOUNTER — ANESTHESIA EVENT (OUTPATIENT)
Dept: ENDOSCOPY | Facility: HOSPITAL | Age: 82
End: 2017-01-07
Payer: MEDICARE

## 2017-01-07 ENCOUNTER — SURGERY (OUTPATIENT)
Age: 82
End: 2017-01-07

## 2017-01-07 PROBLEM — E83.39 HYPOPHOSPHATEMIA: Status: ACTIVE | Noted: 2017-01-07

## 2017-01-07 LAB
ALBUMIN SERPL BCP-MCNC: 2.4 G/DL
ALP SERPL-CCNC: 330 U/L
ALT SERPL W/O P-5'-P-CCNC: 128 U/L
ANION GAP SERPL CALC-SCNC: 10 MMOL/L
AST SERPL-CCNC: 85 U/L
BASOPHILS # BLD AUTO: 0.03 K/UL
BASOPHILS NFR BLD: 0.2 %
BILIRUB SERPL-MCNC: 3.8 MG/DL
BUN SERPL-MCNC: 15 MG/DL
CALCIUM SERPL-MCNC: 9.7 MG/DL
CHLORIDE SERPL-SCNC: 96 MMOL/L
CO2 SERPL-SCNC: 30 MMOL/L
CREAT SERPL-MCNC: 0.9 MG/DL
DIFFERENTIAL METHOD: ABNORMAL
EOSINOPHIL # BLD AUTO: 0 K/UL
EOSINOPHIL NFR BLD: 0.1 %
ERYTHROCYTE [DISTWIDTH] IN BLOOD BY AUTOMATED COUNT: 19.2 %
EST. GFR  (AFRICAN AMERICAN): >60 ML/MIN/1.73 M^2
EST. GFR  (NON AFRICAN AMERICAN): 58 ML/MIN/1.73 M^2
ESTIMATED AVG GLUCOSE: 171 MG/DL
FERRITIN SERPL-MCNC: 118 NG/ML
FOLATE SERPL-MCNC: 11.4 NG/ML
GLUCOSE SERPL-MCNC: 220 MG/DL
HBA1C MFR BLD HPLC: 7.6 %
HCT VFR BLD AUTO: 31.9 %
HGB BLD-MCNC: 10.2 G/DL
IRON SERPL-MCNC: 32 UG/DL
LYMPHOCYTES # BLD AUTO: 1.1 K/UL
LYMPHOCYTES NFR BLD: 7.3 %
MAGNESIUM SERPL-MCNC: 2.3 MG/DL
MCH RBC QN AUTO: 26.6 PG
MCHC RBC AUTO-ENTMCNC: 32 %
MCV RBC AUTO: 83 FL
MONOCYTES # BLD AUTO: 0.8 K/UL
MONOCYTES NFR BLD: 5.7 %
NEUTROPHILS # BLD AUTO: 12.5 K/UL
NEUTROPHILS NFR BLD: 86.7 %
PHOSPHATE SERPL-MCNC: 2.4 MG/DL
PLATELET # BLD AUTO: 482 K/UL
PMV BLD AUTO: 10.7 FL
POCT GLUCOSE: 165 MG/DL (ref 70–110)
POCT GLUCOSE: 212 MG/DL (ref 70–110)
POCT GLUCOSE: 213 MG/DL (ref 70–110)
POCT GLUCOSE: 224 MG/DL (ref 70–110)
POTASSIUM SERPL-SCNC: 3.4 MMOL/L
PROT SERPL-MCNC: 6.7 G/DL
RBC # BLD AUTO: 3.84 M/UL
SATURATED IRON: 11 %
SODIUM SERPL-SCNC: 136 MMOL/L
TOTAL IRON BINDING CAPACITY: 300 UG/DL
TRANSFERRIN SERPL-MCNC: 203 MG/DL
TROPONIN I SERPL DL<=0.01 NG/ML-MCNC: 0.04 NG/ML
TROPONIN I SERPL DL<=0.01 NG/ML-MCNC: 0.05 NG/ML
TROPONIN I SERPL DL<=0.01 NG/ML-MCNC: 0.05 NG/ML
VIT B12 SERPL-MCNC: 1567 PG/ML
WBC # BLD AUTO: 14.47 K/UL

## 2017-01-07 PROCEDURE — 74328 X-RAY BILE DUCT ENDOSCOPY: CPT | Mod: 26,,, | Performed by: INTERNAL MEDICINE

## 2017-01-07 PROCEDURE — 37000008 HC ANESTHESIA 1ST 15 MINUTES: Performed by: INTERNAL MEDICINE

## 2017-01-07 PROCEDURE — 43264 ERCP REMOVE DUCT CALCULI: CPT

## 2017-01-07 PROCEDURE — 27200999 HC RETRIEVAL BALLOON, ERCP: Performed by: INTERNAL MEDICINE

## 2017-01-07 PROCEDURE — 63600175 PHARM REV CODE 636 W HCPCS: Performed by: ANESTHESIOLOGY

## 2017-01-07 PROCEDURE — 94761 N-INVAS EAR/PLS OXIMETRY MLT: CPT

## 2017-01-07 PROCEDURE — 27200949 HC CANNULATOME: Performed by: INTERNAL MEDICINE

## 2017-01-07 PROCEDURE — 25000003 PHARM REV CODE 250: Performed by: ANESTHESIOLOGY

## 2017-01-07 PROCEDURE — C1769 GUIDE WIRE: HCPCS | Performed by: INTERNAL MEDICINE

## 2017-01-07 PROCEDURE — 84484 ASSAY OF TROPONIN QUANT: CPT | Mod: 91

## 2017-01-07 PROCEDURE — 80053 COMPREHEN METABOLIC PANEL: CPT

## 2017-01-07 PROCEDURE — 37000009 HC ANESTHESIA EA ADD 15 MINS: Performed by: INTERNAL MEDICINE

## 2017-01-07 PROCEDURE — 93005 ELECTROCARDIOGRAM TRACING: CPT

## 2017-01-07 PROCEDURE — 25000003 PHARM REV CODE 250: Performed by: HOSPITALIST

## 2017-01-07 PROCEDURE — 43262 ENDO CHOLANGIOPANCREATOGRAPH: CPT | Mod: 59,,, | Performed by: INTERNAL MEDICINE

## 2017-01-07 PROCEDURE — 83036 HEMOGLOBIN GLYCOSYLATED A1C: CPT

## 2017-01-07 PROCEDURE — G0378 HOSPITAL OBSERVATION PER HR: HCPCS

## 2017-01-07 PROCEDURE — 63600175 PHARM REV CODE 636 W HCPCS

## 2017-01-07 PROCEDURE — 99205 OFFICE O/P NEW HI 60 MIN: CPT | Mod: ,,, | Performed by: INTERNAL MEDICINE

## 2017-01-07 PROCEDURE — 43277 ERCP EA DUCT/AMPULLA DILATE: CPT

## 2017-01-07 PROCEDURE — 43264 ERCP REMOVE DUCT CALCULI: CPT | Mod: ,,, | Performed by: INTERNAL MEDICINE

## 2017-01-07 PROCEDURE — 83735 ASSAY OF MAGNESIUM: CPT

## 2017-01-07 PROCEDURE — 36415 COLL VENOUS BLD VENIPUNCTURE: CPT

## 2017-01-07 PROCEDURE — 85025 COMPLETE CBC W/AUTO DIFF WBC: CPT

## 2017-01-07 PROCEDURE — 84100 ASSAY OF PHOSPHORUS: CPT

## 2017-01-07 PROCEDURE — 25000003 PHARM REV CODE 250

## 2017-01-07 RX ORDER — SODIUM CHLORIDE, SODIUM LACTATE, POTASSIUM CHLORIDE, CALCIUM CHLORIDE 600; 310; 30; 20 MG/100ML; MG/100ML; MG/100ML; MG/100ML
INJECTION, SOLUTION INTRAVENOUS CONTINUOUS PRN
Status: DISCONTINUED | OUTPATIENT
Start: 2017-01-07 | End: 2017-01-07

## 2017-01-07 RX ORDER — ESMOLOL HYDROCHLORIDE 10 MG/ML
INJECTION INTRAVENOUS
Status: DISCONTINUED | OUTPATIENT
Start: 2017-01-07 | End: 2017-01-07

## 2017-01-07 RX ORDER — PROPOFOL 10 MG/ML
VIAL (ML) INTRAVENOUS
Status: DISCONTINUED | OUTPATIENT
Start: 2017-01-07 | End: 2017-01-07

## 2017-01-07 RX ORDER — SODIUM CHLORIDE 0.9 % (FLUSH) 0.9 %
3 SYRINGE (ML) INJECTION EVERY 8 HOURS
Status: DISCONTINUED | OUTPATIENT
Start: 2017-01-07 | End: 2017-01-08 | Stop reason: HOSPADM

## 2017-01-07 RX ORDER — RAMIPRIL 2.5 MG/1
5 CAPSULE ORAL NIGHTLY
Status: DISCONTINUED | OUTPATIENT
Start: 2017-01-08 | End: 2017-01-08 | Stop reason: HOSPADM

## 2017-01-07 RX ORDER — HYDROCODONE BITARTRATE AND ACETAMINOPHEN 5; 325 MG/1; MG/1
1 TABLET ORAL
Status: DISCONTINUED | OUTPATIENT
Start: 2017-01-07 | End: 2017-01-08 | Stop reason: HOSPADM

## 2017-01-07 RX ORDER — HYDROMORPHONE HYDROCHLORIDE 2 MG/ML
0.2 INJECTION, SOLUTION INTRAMUSCULAR; INTRAVENOUS; SUBCUTANEOUS EVERY 5 MIN PRN
Status: DISCONTINUED | OUTPATIENT
Start: 2017-01-07 | End: 2017-01-08 | Stop reason: HOSPADM

## 2017-01-07 RX ORDER — CHOLECALCIFEROL (VITAMIN D3) 10 MCG
2000 TABLET ORAL NIGHTLY
Status: DISCONTINUED | OUTPATIENT
Start: 2017-01-07 | End: 2017-01-08 | Stop reason: HOSPADM

## 2017-01-07 RX ORDER — SUCCINYLCHOLINE CHLORIDE 20 MG/ML
INJECTION INTRAMUSCULAR; INTRAVENOUS
Status: DISCONTINUED | OUTPATIENT
Start: 2017-01-07 | End: 2017-01-07

## 2017-01-07 RX ORDER — LABETALOL HYDROCHLORIDE 5 MG/ML
10 INJECTION, SOLUTION INTRAVENOUS ONCE
Status: COMPLETED | OUTPATIENT
Start: 2017-01-07 | End: 2017-01-07

## 2017-01-07 RX ORDER — ONDANSETRON HYDROCHLORIDE 2 MG/ML
INJECTION, SOLUTION INTRAMUSCULAR; INTRAVENOUS
Status: DISCONTINUED | OUTPATIENT
Start: 2017-01-07 | End: 2017-01-07

## 2017-01-07 RX ORDER — FENTANYL CITRATE 50 UG/ML
INJECTION, SOLUTION INTRAMUSCULAR; INTRAVENOUS
Status: DISCONTINUED | OUTPATIENT
Start: 2017-01-07 | End: 2017-01-07

## 2017-01-07 RX ORDER — POTASSIUM CHLORIDE 7.45 MG/ML
10 INJECTION INTRAVENOUS
Status: COMPLETED | OUTPATIENT
Start: 2017-01-07 | End: 2017-01-07

## 2017-01-07 RX ORDER — TAMSULOSIN HYDROCHLORIDE 0.4 MG/1
0.4 CAPSULE ORAL NIGHTLY
Status: DISCONTINUED | OUTPATIENT
Start: 2017-01-08 | End: 2017-01-08 | Stop reason: HOSPADM

## 2017-01-07 RX ORDER — MEPERIDINE HYDROCHLORIDE 50 MG/ML
12.5 INJECTION INTRAMUSCULAR; INTRAVENOUS; SUBCUTANEOUS ONCE AS NEEDED
Status: ACTIVE | OUTPATIENT
Start: 2017-01-07 | End: 2017-01-07

## 2017-01-07 RX ORDER — CIPROFLOXACIN 2 MG/ML
INJECTION, SOLUTION INTRAVENOUS
Status: DISCONTINUED | OUTPATIENT
Start: 2017-01-07 | End: 2017-01-07

## 2017-01-07 RX ORDER — HEPARIN SODIUM 5000 [USP'U]/ML
5000 INJECTION, SOLUTION INTRAVENOUS; SUBCUTANEOUS EVERY 12 HOURS
Status: DISCONTINUED | OUTPATIENT
Start: 2017-01-08 | End: 2017-01-08 | Stop reason: HOSPADM

## 2017-01-07 RX ORDER — SODIUM CHLORIDE 0.9 % (FLUSH) 0.9 %
3 SYRINGE (ML) INJECTION
Status: DISCONTINUED | OUTPATIENT
Start: 2017-01-07 | End: 2017-01-08 | Stop reason: HOSPADM

## 2017-01-07 RX ORDER — SODIUM,POTASSIUM PHOSPHATES 280-250MG
1 POWDER IN PACKET (EA) ORAL
Status: DISCONTINUED | OUTPATIENT
Start: 2017-01-07 | End: 2017-01-08 | Stop reason: HOSPADM

## 2017-01-07 RX ORDER — LIDOCAINE HYDROCHLORIDE 20 MG/ML
INJECTION, SOLUTION EPIDURAL; INFILTRATION; INTRACAUDAL; PERINEURAL
Status: DISCONTINUED | OUTPATIENT
Start: 2017-01-07 | End: 2017-01-07

## 2017-01-07 RX ORDER — IPRATROPIUM BROMIDE AND ALBUTEROL SULFATE 2.5; .5 MG/3ML; MG/3ML
3 SOLUTION RESPIRATORY (INHALATION) EVERY 6 HOURS PRN
Status: DISCONTINUED | OUTPATIENT
Start: 2017-01-07 | End: 2017-01-08 | Stop reason: HOSPADM

## 2017-01-07 RX ADMIN — BRINZOLAMIDE 1 DROP: 10 SUSPENSION/ DROPS OPHTHALMIC at 09:01

## 2017-01-07 RX ADMIN — POTASSIUM & SODIUM PHOSPHATES POWDER PACK 280-160-250 MG 1 PACKET: 280-160-250 PACK at 05:01

## 2017-01-07 RX ADMIN — ESMOLOL HYDROCHLORIDE 30 MG: 10 INJECTION, SOLUTION INTRAVENOUS at 01:01

## 2017-01-07 RX ADMIN — SODIUM CHLORIDE, SODIUM LACTATE, POTASSIUM CHLORIDE, AND CALCIUM CHLORIDE: .6; .31; .03; .02 INJECTION, SOLUTION INTRAVENOUS at 12:01

## 2017-01-07 RX ADMIN — CHOLECALCIFEROL TAB 10 MCG (400 UNIT) 2000 UNITS: 10 TAB at 09:01

## 2017-01-07 RX ADMIN — POTASSIUM CHLORIDE 10 MEQ: 10 INJECTION, SOLUTION INTRAVENOUS at 09:01

## 2017-01-07 RX ADMIN — FAMOTIDINE 20 MG: 20 TABLET, FILM COATED ORAL at 09:01

## 2017-01-07 RX ADMIN — FENTANYL CITRATE 100 MCG: 50 INJECTION, SOLUTION INTRAMUSCULAR; INTRAVENOUS at 12:01

## 2017-01-07 RX ADMIN — ONDANSETRON 4 MG: 2 INJECTION, SOLUTION INTRAMUSCULAR; INTRAVENOUS at 01:01

## 2017-01-07 RX ADMIN — INSULIN ASPART 2 UNITS: 100 INJECTION, SOLUTION INTRAVENOUS; SUBCUTANEOUS at 05:01

## 2017-01-07 RX ADMIN — POTASSIUM CHLORIDE 10 MEQ: 10 INJECTION, SOLUTION INTRAVENOUS at 08:01

## 2017-01-07 RX ADMIN — INSULIN ASPART 2 UNITS: 100 INJECTION, SOLUTION INTRAVENOUS; SUBCUTANEOUS at 08:01

## 2017-01-07 RX ADMIN — CETIRIZINE HYDROCHLORIDE 5 MG: 5 TABLET, FILM COATED ORAL at 08:01

## 2017-01-07 RX ADMIN — POTASSIUM CHLORIDE 10 MEQ: 10 INJECTION, SOLUTION INTRAVENOUS at 11:01

## 2017-01-07 RX ADMIN — QUETIAPINE FUMARATE 100 MG: 100 TABLET, FILM COATED ORAL at 09:01

## 2017-01-07 RX ADMIN — FAMOTIDINE 20 MG: 20 TABLET, FILM COATED ORAL at 08:01

## 2017-01-07 RX ADMIN — MAGNESIUM SULFATE IN WATER 2 G: 40 INJECTION, SOLUTION INTRAVENOUS at 12:01

## 2017-01-07 RX ADMIN — SUCCINYLCHOLINE CHLORIDE 100 MG: 20 INJECTION, SOLUTION INTRAMUSCULAR; INTRAVENOUS at 12:01

## 2017-01-07 RX ADMIN — SODIUM CHLORIDE, PRESERVATIVE FREE 3 ML: 5 INJECTION INTRAVENOUS at 09:01

## 2017-01-07 RX ADMIN — OXYBUTYNIN CHLORIDE 5 MG: 5 TABLET, EXTENDED RELEASE ORAL at 08:01

## 2017-01-07 RX ADMIN — SODIUM CHLORIDE, PRESERVATIVE FREE 3 ML: 5 INJECTION INTRAVENOUS at 02:01

## 2017-01-07 RX ADMIN — ESMOLOL HYDROCHLORIDE 20 MG: 10 INJECTION, SOLUTION INTRAVENOUS at 01:01

## 2017-01-07 RX ADMIN — LATANOPROST 1 DROP: 50 SOLUTION OPHTHALMIC at 09:01

## 2017-01-07 RX ADMIN — INSULIN ASPART 2 UNITS: 100 INJECTION, SOLUTION INTRAVENOUS; SUBCUTANEOUS at 11:01

## 2017-01-07 RX ADMIN — POTASSIUM & SODIUM PHOSPHATES POWDER PACK 280-160-250 MG 1 PACKET: 280-160-250 PACK at 09:01

## 2017-01-07 RX ADMIN — BRINZOLAMIDE 1 DROP: 10 SUSPENSION/ DROPS OPHTHALMIC at 08:01

## 2017-01-07 RX ADMIN — LIDOCAINE HYDROCHLORIDE 100 MG: 20 INJECTION, SOLUTION EPIDURAL; INFILTRATION; INTRACAUDAL; PERINEURAL at 12:01

## 2017-01-07 RX ADMIN — PROPOFOL 50 MG: 10 INJECTION, EMULSION INTRAVENOUS at 12:01

## 2017-01-07 RX ADMIN — LABETALOL HYDROCHLORIDE 10 MG: 5 INJECTION, SOLUTION INTRAVENOUS at 01:01

## 2017-01-07 RX ADMIN — AMLODIPINE BESYLATE 5 MG: 5 TABLET ORAL at 08:01

## 2017-01-07 RX ADMIN — HEPARIN SODIUM 5000 UNITS: 5000 INJECTION, SOLUTION INTRAVENOUS; SUBCUTANEOUS at 08:01

## 2017-01-07 RX ADMIN — CIPROFLOXACIN 400 MG: 2 INJECTION, SOLUTION INTRAVENOUS at 12:01

## 2017-01-07 RX ADMIN — HYDROCODONE BITARTRATE AND ACETAMINOPHEN 1 TABLET: 5; 325 TABLET ORAL at 10:01

## 2017-01-07 NOTE — PLAN OF CARE
"VSS. Dr Andrew notified pt's bp is better, but staying in 170-180 systolic. States "Patient may go to her room at this time". Report called to YVETTE Bauer, with time allotted for questions.   "

## 2017-01-07 NOTE — PLAN OF CARE
Problem: Patient Care Overview  Goal: Plan of Care Review  Outcome: Ongoing (interventions implemented as appropriate)  Patient resting in bed, AAOx4. Family at the bedside. Medications administered as ordered. No complaints of pain or discomfort. Patient ambulated around unit accompanied by daughter and nurse, safety maintained. Patient asleep for majority of shift. Encouraged to call with needs or concerns. Will continue to monitor.

## 2017-01-07 NOTE — PLAN OF CARE
01/07/17 1453   Discharge Assessment   Assessment Type Discharge Planning Assessment   Confirmed/corrected address and phone number on facesheet? Yes   Assessment information obtained from? Caregiver  (Nelly Jasso [phone])   Expected Length of Stay (days) 2   Prior to hospitilization cognitive status: Alert/Oriented   Prior to hospitalization functional status: Needs Assistance;Assistive Equipment   Current cognitive status: Alert/Oriented   Current Functional Status: Needs Assistance;Assistive Equipment   Arrived From other (see comments)  (Transferred from [facility])   Lives With alone   Able to Return to Prior Arrangements unable to determine at this time (comments)   Is patient able to care for self after discharge? Unable to determine at this time (comments)   How many people do you have in your home that can help with your care after discharge? other (see comments)  (pt lives alone but has many family members available to assist.)   Who are your caregiver(s) and their phone number(s)? Nelly Jasso [phone]   Patient's perception of discharge disposition home or selfcare   Readmission Within The Last 30 Days other (see comments)  (Transferred from [facility] for same issue)   Patient currently being followed by outpatient case management? No   Patient currently receives home health services? No   Does the patient currently use HME? Yes   Patient currently receives private duty nursing? No   Patient currently receives any other outside agency services? No   Equipment Currently Used at Home rollator;cane, straight;bedside commode;bath bench;power chair   Do you have any problems affording any of your prescribed medications? No   Is the patient taking medications as prescribed? yes   Do you have any financial concerns preventing you from receiving the healthcare you need? No   Does the patient have transportation to healthcare appointments? Yes    Transportation Available family or friend will provide   On Dialysis? No   Does the patient receive services at the Coumadin Clinic? No   Are there any open cases? No   Discharge Plan A Home   Discharge Plan B Home with family   Patient/Family In Agreement With Plan yes     Leticia Ayala RN Transitional Navigator  [phone]

## 2017-01-07 NOTE — PLAN OF CARE
U Internal Medicine Plan of Care    Patient done with ERCP. Will keep her NPO for 6 hours, then start clear liquid diet and advance as tolerated. Will also hold PPX heparin dose tonight and place SCDs now. She will resume PPX heparin in the morning.     Gabby Drake MD, MPH  [facility] Internal Medicine, HO-2  [facility] Internal Medicine Team A  [phone]

## 2017-01-07 NOTE — ANESTHESIA RELEASE NOTE
Anesthesia Release from PACU Note    Patient: Tatiana Thao    Procedure(s) Performed: Procedure(s) (LRB):  ERCP (N/A)    Anesthesia type: general    Post pain: Adequate analgesia    Post assessment: no apparent anesthetic complications, tolerated procedure well and no evidence of recall    Last Vitals:   Visit Vitals    BP (!) 174/67    Pulse 63    Temp 36.3 °C (97.4 °F) (Oral)    Resp 18    Ht 5' (1.524 m)    Wt 67.4 kg (148 lb 8 oz)    SpO2 98%    Breastfeeding No    BMI 29 kg/m2       Post vital signs: stable    Level of consciousness: awake, alert  and oriented    Nausea/Vomiting: no nausea/no vomiting    Complications: none    Airway Patency: patent    Respiratory: unassisted    Cardiovascular: stable, blood pressure at baseline and hypertensive    Hydration: euvolemic

## 2017-01-07 NOTE — ANESTHESIA PREPROCEDURE EVALUATION
01/07/2017     Tatiana Thao is a 87 y.o., female here for ERCP.    Review of patient's allergies indicates:   Allergen Reactions    Adhesive Other (See Comments)    Dilaudid [hydromorphone] Hallucinations    Elavil [amitriptyline] Other (See Comments)    Morphine Other (See Comments)     hallucinations    Prednisone      Other reaction(s): Unknown     Vitals:    01/07/17 0800   BP: (!) 149/70   Pulse: 92   Resp: 17   Temp: 36.3 °C (97.4 °F)     Past Medical History   Diagnosis Date    Asthma     Colon polyp     COPD (chronic obstructive pulmonary disease)     Coronary artery disease     Hypertension      Past Surgical History   Procedure Laterality Date    Hysterectomy      Back surgery      Appendectomy      Bladder surgery       bladder suspension    Oophorectomy       Patient Active Problem List   Diagnosis    Pancreatitis due to biliary obstruction    LBBB (left bundle branch block)    HTN (hypertension)    COPD (chronic obstructive pulmonary disease)    Asthma    H/O colonoscopy with polypectomy    CAD (coronary artery disease)    Constipation    Urethral diverticulum    Zenker diverticulum    ANDREW (iron deficiency anemia)    Heart failure of unknown type    CKD (chronic kidney disease) stage 2, GFR 60-89 ml/min    AICD (automatic cardioverter/defibrillator) present    Abdominal pain    Diarrhea    Jaundice    Mitral regurgitation    Cardiomyopathy    Total bilirubin, elevated    Transaminitis    Glaucoma    Urinary incontinence    Leukocytosis    Normocytic anemia    Thrombocytosis    Hyperglycemia    Hypomagnesemia    Hypophosphatemia     Lab Results   Component Value Date    WBC 14.47 (H) 01/07/2017    HGB 10.2 (L) 01/07/2017    HCT 31.9 (L) 01/07/2017     (H) 01/07/2017     (H) 01/07/2017    AST 85 (H) 01/07/2017     01/07/2017    K  3.4 (L) 01/07/2017    CL 96 01/07/2017    CREATININE 0.9 01/07/2017    BUN 15 01/07/2017    CO2 30 (H) 01/07/2017    INR 1.1 01/06/2017     [facility] Anesthesia Evaluation    I have reviewed the Patient Summary Reports.    I have reviewed the Nursing Notes.   I have reviewed the Medications.     Review of Systems  Anesthesia Hx:  No problems with previous Anesthesia  Denies Family Hx of Anesthesia complications.   Denies Personal Hx of Anesthesia complications.   Social:  Non-Smoker, No Alcohol Use    Cardiovascular:   ECG has been reviewed. Abnormal EKG       Physical Exam  General:  Well nourished    Airway/Jaw/Neck:  Airway Findings: Mouth Opening: Small, but > 3cm Tongue: Normal  Pre-Existing Airway Tube(s): Oral Endotracheal tube  General Airway Assessment: Adult  Mallampati: IV  Improves to III with phonation.  TM Distance: 4 - 6 cm  Jaw/Neck Findings:  Neck ROM: Normal ROM  Neck Findings:  Girth Increased      Dental:  Dental Findings: Edentulous        Mental Status:  Mental Status Findings:  Cooperative, Alert and Oriented         Anesthesia Plan  Type of Anesthesia, risks & benefits discussed:  Anesthesia Type:  general  Patient's Preference:   Intra-op Monitoring Plan:   Intra-op Monitoring Plan Comments:   Post Op Pain Control Plan:   Post Op Pain Control Plan Comments:   Induction:    Beta Blocker:         Informed Consent: Patient understands risks and agrees with Anesthesia plan.  Questions answered. Anesthesia consent signed with patient.  ASA Score: 3  emergent   Day of Surgery Review of History & Physical: I have interviewed and examined the patient. I have reviewed the patient's H&P dated:  There are no significant changes.      Anesthesia Plan Notes: Spoke with daughter. Explained that patient is at higher risk for complications secondary to cardiac history. Spoke with [employer] Rep ([id number]), stated to just put magnet on and take off at end of case. Remove immediately in the event of an arrhythmia.  Does not need to be interrogated post op. AICD placed in November 2016. SN# [id number]. Asterion MRI CRT-D Surescan [id number].        Ready For Surgery From Anesthesia Perspective.

## 2017-01-07 NOTE — PROGRESS NOTES
Received call from Dr. Drake and ordered to give sliding scale insulin to patient for this am accucheck.  Dr. Drake also ordered to hold all po medications at this time until she talks to GI.  Dr. Fu visited patient and explained ERCP to patient and daughter.  Patient's daughter indicated that several medications are taken at night.  MD notified.  Will continue to monitor.

## 2017-01-07 NOTE — ANESTHESIA POSTPROCEDURE EVALUATION
Anesthesia Post Evaluation    Patient: Tatiana Thao    Procedure(s) Performed: Procedure(s) (LRB):  ERCP (N/A)    Final Anesthesia Type: general  Patient location during evaluation: PACU  Patient participation: Yes- Able to Participate  Level of consciousness: awake and alert  Post-procedure vital signs: reviewed and stable  Pain management: adequate  Airway patency: patent  PONV status at discharge: No PONV  Anesthetic complications: no      Cardiovascular status: hemodynamically stable, hypertensive and blood pressure returned to baseline  Respiratory status: unassisted  Hydration status: euvolemic  Follow-up not needed.        Visit Vitals    BP (!) 174/67    Pulse 63    Temp 36.3 °C (97.4 °F) (Oral)    Resp 18    Ht 5' (1.524 m)    Wt 67.4 kg (148 lb 8 oz)    SpO2 98%    Breastfeeding No    BMI 29 kg/m2       Pain/Adilene Score: Pain Assessment Performed: Yes (1/7/2017  2:10 PM)  Presence of Pain: denies (1/7/2017  2:10 PM)  Adilene Score: 10 (1/7/2017  2:10 PM)

## 2017-01-07 NOTE — PLAN OF CARE
U Internal Medicine Plan of Care Note      CARDIAC RISK DETERMINATION FOR ERCP   She is intermediate risk for a low risk procedure.     Active Cardiac Conditions: NONE  - MI within 30 days or current unstable or severe angina  - decompensated HF  - significant arrhythmia (high grade AVB, Mobitz II)  - third degree AV block, new or symptomatic VT, SVT with HR >100, symptomatic bradycardia  - severe AS or symptomatic MS    Surgery Risk:  Low - endoscopy  superficial breast  ambulatory cataract  Intermediate - intrathoracic  intraperitoneal  prostate  CEA  head and neck  ortho  High - Aortic or other major vascular  peripheral vascular    Clinical Risk Factors:  - h/o CAD, HF, CVA, DM, renal insufficient (Cr >2)    METS  1-4 - ADLs, walk indoors, walk 1-2 level blocks  4-10 - Climb a flight of stairs/hill, walk briskly, heavy housework, golf/doubles tennis (mowing her own grass and distance limited only by knee pain)  >10 - strenuous sports        Cat MD Vidal, MPH  [facility] Internal Medicine, HO-2  [facility] Internal Medicine Team A  [phone]

## 2017-01-07 NOTE — PROGRESS NOTES
Patient arrived to floor from [facility] per stretcher per EMTs.  Patient is awake, alert, and oriented.  Daughter is at bedside.  Dr. Fu notified of patient arrival.  MD notified of elevated BP.  Will give report to oncoming nurse.  Safety maintained.

## 2017-01-07 NOTE — PROGRESS NOTES
[facility] IM Resident STEPHANI Progress Note    Subjective:      Pt. States that her abdominal pain has improved overnight. She appears less jaundiced this morning, and she is more alert.     Objective:   Last 24 Hour Vital Signs:  BP  Min: 139/61  Max: 205/82  Temp  Av.6 °F (36.4 °C)  Min: 96.3 °F (35.7 °C)  Max: 98.6 °F (37 °C)  Pulse  Av.3  Min: 79  Max: 92  Resp  Av.5  Min: 17  Max: 18  Height  Av' (152.4 cm)  Min: 5' (152.4 cm)  Max: 5' (152.4 cm)  Weight  Av.4 kg (148 lb 8 oz)  Min: 67.4 kg (148 lb 8 oz)  Max: 67.4 kg (148 lb 8 oz)  I/O last 3 completed shifts:  In: 50 [I.V.:50]  Out: 800 [Urine:800]    Physical Examination:  General appearance: appears stated age, cooperative and slowed mentation  Throat: lips, mucosa, and tongue normal; teeth absent  Eyes: Scleral icterus  Lungs: clear to auscultation bilaterally  Heart: regular rate and rhythm, S1, S2 normal, no murmurs  Abdomen: soft, non-tender; bowel sounds normal; no masses, no organomegaly  Extremities: extremities normal, atraumatic, no cyanosis or edema  Pulses: 2+ and symmetric  Skin:Jaundice improved from yesterday, Ecchymoses present bilaterally on dorsum of hands and wrists       Laboratory:  Laboratory Data Reviewed: yes  CBC:   Recent Labs  Lab 17  0558   WBC 14.47*   RBC 3.84*   HGB 10.2*   HCT 31.9*   *   MCV 83   MCH 26.6*   MCHC 32.0     CMP:   Recent Labs  Lab 17  0558   *   CALCIUM 9.7   ALBUMIN 2.4*   PROT 6.7      K 3.4*   CO2 30*   CL 96   BUN 15   CREATININE 0.9   ALKPHOS 330*   *   AST 85*   BILITOT 3.8*       Current Medications:     Infusions:        Scheduled:   amlodipine  5 mg Oral Daily    brinzolamide  1 drop Both Eyes BID    cetirizine  5 mg Oral Daily    cholecalciferol (vitamin D3)  2,000 Units Oral Daily    famotidine  20 mg Oral BID    heparin (porcine)  5,000 Units Subcutaneous Q12H    latanoprost  1 drop Both Eyes QHS    oxybutynin  5 mg Oral Daily     potassium chloride  10 mEq Intravenous Q1H    quetiapine  100 mg Oral QHS    ramipril  5 mg Oral Daily    tamsulosin  0.4 mg Oral Daily        PRN:  dextrose 50%, glucagon (human recombinant), insulin aspart, labetalol, nalbuphine      Assessment:     Tatiana Thao is a 87 y.o.female with  Patient Active Problem List    Diagnosis Date Noted    Hypophosphatemia 01/07/2017    Pancreatitis due to biliary obstruction 01/06/2017    LBBB (left bundle branch block) 01/06/2017    HTN (hypertension) 01/06/2017    COPD (chronic obstructive pulmonary disease) 01/06/2017    Asthma 01/06/2017    H/O colonoscopy with polypectomy 01/06/2017    CAD (coronary artery disease) 01/06/2017    Constipation 01/06/2017    Urethral diverticulum 01/06/2017    Zenker diverticulum 01/06/2017    ANDREW (iron deficiency anemia) 01/06/2017    Heart failure of unknown type 01/06/2017    CKD (chronic kidney disease) stage 2, GFR 60-89 ml/min 01/06/2017    AICD (automatic cardioverter/defibrillator) present 01/06/2017    Abdominal pain 01/06/2017    Diarrhea 01/06/2017    Jaundice 01/06/2017    Mitral regurgitation 01/06/2017    Cardiomyopathy 01/06/2017    Total bilirubin, elevated 01/06/2017    Transaminitis 01/06/2017    Glaucoma 01/06/2017    Urinary incontinence 01/06/2017    Leukocytosis 01/06/2017    Normocytic anemia 01/06/2017    Thrombocytosis 01/06/2017    Hyperglycemia 01/06/2017    Hypomagnesemia 01/06/2017        Plan:   Gallstone Pancreatitis  - -Pt. With persistent epigastric abdominal pain, elevated lipase, and CT findings consistent with gallstone pancreatitis  - Lipase: 1399 at OSH  - ALT: 139, AST: 175 at OSH  -T Bilirubin: 5.3 OSH  -[place] II score 8 ( 6 points for age, 2 points for hyponatremia)  -NPO for now will consider starting IV LR once CMP results  -T. Bili 3.8 today, AST and ALT trending downward  -Pt. With symptomatic improvement  -Plan for ERCP today; (low risk procedure)    CAD w/  "cardiomyopathy s/p AICD  -According to pt. Daughter, pt. Had evidence of old infarct on a "cardiology study"  -Pt. Also has AICD for unknown reason  -Pt. Has no active chest pain  -Ordered EKG (pt. With known LBBB) and troponin to evaluate for active ischemia in order to determine risk assessment for noncardiac surgery (pt. To have low risk procedure and is currently able to perform 3-4 METs, risk factors include h/o CAD & HF)  -Will Continue aspirin  -Troponin 0.046-->0.042-->0.046, pt. May be close to baseline with CKD II  -Do not suspect ACS in setting of no chest pain     Heart Failure  -Unknown EF, daughter reports that her Doctor told her she had "mild HF"  -Continue ACE and monitor for signs of fluid overload  -Pt. Lying flat without SOB, no signs of fluid overload on PE     HTN  -BP elevated on presentation, 184/76  -Pt. Not given BP meds while in [place] ED today  -Will restart home meds tonight to lower BP  -After tonight, will continue with normal daily dosing     Normocytic Anemia  -Hgb 10.2 today, no known baseline  -patient's mother reports she has history of ANDREW  -Ferritin 118     GERD  -Continue Pepcid, no acute issues     Urinary incontinence  -Continue home oxybutynin and flomax     CKD II  -Pt. With CKD II, daughter reports that this is chronic  -Cr in [place] 0.59, 0.9 this morning, will continue to follow     Hyponatremia  -Na+ 128 at outside hospital, will reorder CMP and treat if necessary  -Na+ 136 this morning, will continue to follow     Glaucoma  -Continue home eye drops     Ppx: heparin  Diet: NPO for procedure  Dispo: Pending ERCP tomorrow    Vsau Fu  [facility] Internal Medicine HO-I  U IM Service Team A    [facility] Medicine Hospitalist Pager numbers:   [facility] Hospitalist Medicine Team A (Elaina/Lisette): [phone]  [facility] Hospitalist Medicine Team B (Saran/Anna):  [phone]    "

## 2017-01-07 NOTE — TRANSFER OF CARE
Anesthesia Transfer of Care Note    Patient: Tatiana Thao    Procedure(s) Performed: Procedure(s) (LRB):  ERCP (N/A)    Patient location: ICU    Anesthesia Type: general    Transport from OR: Transported from OR on 6-10 L/min O2 by face mask with adequate spontaneous ventilation    Post pain: adequate analgesia    Post assessment: no apparent anesthetic complications    Post vital signs: stable    Level of consciousness: awake, alert and oriented    Nausea/Vomiting: no nausea/vomiting    Complications: none          Last vitals:   Visit Vitals    BP (!) 183/79    Pulse 85    Temp 36.3 °C (97.4 °F) (Oral)    Resp 13    Ht 5' (1.524 m)    Wt 67.4 kg (148 lb 8 oz)    SpO2 100%    Breastfeeding No    BMI 29 kg/m2

## 2017-01-07 NOTE — H&P
[facility] History and Physical - Resident Note    Admitting Team: Team A  Attending Physician: Dr. Delaney  Resident: Joie Drake  Interns: Vasu Fu    Date of Admit: 1/6/2017    Chief Complaint     Abdominal pain x 10 days    Subjective:      History of Present Illness:  Tatiana Thao is a 87 y.o.  female who  has a past medical history of Asthma; Colon polyp; COPD (chronic obstructive pulmonary disease); Coronary artery disease; and Hypertension.     87 yr old female with a history of gallstone pancreatitis (years ago), Asthma, CAD, and HTN saw her doctor in [place], [place] 10 days ago for worsening epigastric abdominal pain. The patient was referred by her doctor to the ED, and she was discovered to have an elevated lipase and other lab findings consistent with pancreatitis according to the daughter. The patient was transferred at that time to [facility] for potential endoscopic treatment of gallstone pancreatitis. While in [facility], the patient stayed 5 days in the ICU where she was treated conservatively with antibiotics, IV fluids, and symptomatic relief. RUQ U/S at that time showed no duct dilatation, so endoscopic intervention was not performed. After 5 days, the patient had significant symptomatic improvement , so she was sent home with surgery follow up and instructions to return if symptoms worsened. The patient's epigastric pain worsened over the next few days, and she started to have dark foul-smelling diarrhea, so she returned to [facility] yesterday. Labs from yesterday showed a lipase of 1799, t bili of 5.3, and CT scan consistent with gallstone pancreatitis. She presents today after being transferred GI evaluation and ERCP. On my interview, the patient denies any nausea, vomiting, chest pain, fevers, chills, or SOB. Her only complaint is abdominal pain which is diffuse, but worse in the epigastric region with radiation to the back. The daughter does report that  the patient has been less alert than usual over the last few days with some memory loss while she was in the hospital at [place]. At her baseline, the patient reports that she can walk over 200 steps without feeling SOB, and she is typically limited by weakness and joint pain.    Past Medical History:  Past Medical History   Diagnosis Date    Asthma     Colon polyp     COPD (chronic obstructive pulmonary disease)     Coronary artery disease     Hypertension        Past Surgical History:  Past Surgical History   Procedure Laterality Date    Hysterectomy      Back surgery      Appendectomy      Bladder surgery       bladder suspension    Oophorectomy         Allergies:  Review of patient's allergies indicates:   Allergen Reactions    Morphine Other (See Comments)     hallucinations    Prednisone      Other reaction(s): Unknown       Home Medications:  Prior to Admission medications    Medication Sig Start Date End Date Taking? Authorizing Provider   AMLODIPINE BESYLATE (NORVASC ORAL) Take 5 mg by mouth once daily.    Yes Historical Provider, MD   brinzolamide (AZOPT) 1 % ophthalmic suspension Place 1 drop into both eyes 2 (two) times daily.   Yes Historical Provider, MD   cholecalciferol, vitamin D3, (VITAMIN D3) 2,000 unit Cap Take 2,000 Units by mouth once daily.   Yes Historical Provider, MD   co-enzyme Q-10 30 mg capsule Take 100 mg by mouth once daily. Up to 500mg   Yes Historical Provider, MD   docusate sodium (COLACE) 100 MG capsule Take 100 mg by mouth 2 (two) times daily.   Yes Historical Provider, MD   ergocalciferol (VITAMIN D2) 50,000 unit Cap Take 50,000 Units by mouth every 14 (fourteen) days.   Yes Historical Provider, MD   famotidine (PEPCID) 20 MG tablet Take 20 mg by mouth 2 (two) times daily.   Yes Historical Provider, MD   FEXOFENADINE HCL (ALLEGRA ORAL) Take 180 mg by mouth once daily.    Yes Historical Provider, MD   furosemide (LASIX) 20 MG tablet Take 20 mg by mouth once daily. 1  Tablet Oral Every other day   Yes Historical Provider, MD   latanoprost 0.005 % ophthalmic solution Place 1 drop into both eyes every evening.   Yes Historical Provider, MD   magnesium oxide (MAG-OX) 400 mg tablet Take 400 mg by mouth once daily.   Yes Historical Provider, MD   melatonin 3 mg Tab Take 3 mg by mouth every evening.   Yes Historical Provider, MD   oxybutynin (DITROPAN-XL) 5 MG TR24 Take 5 mg by mouth once daily.   Yes Historical Provider, MD   quetiapine (SEROQUEL) 100 MG Tab Take 100 mg by mouth every evening.   Yes Historical Provider, MD   ramipril (ALTACE) 5 MG capsule Take 5 mg by mouth once daily.   Yes Historical Provider, MD   tamsulosin (FLOMAX) 0.4 mg Cp24 Take 0.4 mg by mouth once daily.   Yes Historical Provider, MD   albuterol-ipratropium  mcg (COMBIVENT)  mcg/actuation inhaler Inhale into the lungs. 1 Aerosol Inhalation Every other day  1/6/17  Historical Provider, MD   amitriptyline (ELAVIL) 50 MG tablet Take by mouth. 1 Tablet Oral Every day  1/6/17  Historical Provider, MD   aspirin (ECOTRIN) 81 MG EC tablet Take by mouth. 1 Tablet, Delayed Release (E.C.) Oral Every day  1/6/17  Historical Provider, MD   digoxin (LANOXIN) 125 mcg tablet Take by mouth. 1 Tablet Oral Every day  1/6/17  Historical Provider, MD   estradiol (ESTRACE) 0.01 % (0.1 mg/g) vaginal cream Apply one finger tip of urethra 3 times a week. 2/5/13 1/6/17  MANUEL Bell   fluticasone-salmeterol 100-50 mcg/dose (ADVAIR DISKUS) 100-50 mcg/dose diskus inhaler Inhale into the lungs. 1 Disk with Device Inhalation Twice a day  1/6/17  Historical Provider, MD   montelukast (SINGULAIR) 10 mg tablet Take by mouth. 1 Tablet Oral Every day  1/6/17  Historical Provider, MD   spironolactone (ALDACTONE) 25 MG tablet Take 25 mg by mouth once daily. 1 Tablet Oral Every other day  1/6/17  Historical Provider, MD       Family History:  Family History   Problem Relation Age of Onset    Urolithiasis Neg Hx      Kidney cancer Neg Hx     Prostate cancer Neg Hx        Social History:  Never smoker, Does not drink alcohol or use drugs    Review of Systems:  Pertinent items are noted in HPI. All other systems are reviewed and are negative.    Health Maintaince :   Primary Care Physician: Av  Immunizations:   TDap Unsure if up to date.  Influenza is up to date.  Pneumovax is up to date  Cancer Screening:  Colonoscopy: is up to date.     Objective:   Last 24 Hour Vital Signs:  BP  Min: 184/76  Max: 184/76  Temp  Av.9 °F (36.6 °C)  Min: 97.9 °F (36.6 °C)  Max: 97.9 °F (36.6 °C)  Pulse  Av  Min: 85  Max: 85  Resp  Av  Min: 18  Max: 18  There is no height or weight on file to calculate BMI.       Physical Examination:  General appearance: appears stated age, cooperative and slowed mentation  Throat: lips, mucosa, and tongue normal; teeth absent  Eyes: Scleral icterus  Lungs: clear to auscultation bilaterally  Heart: regular rate and rhythm, S1, S2 normal, no murmur, click, rub or gallop and systolic murmur: holosystolic 2/6, blowing at apex  Abdomen: soft, non-tender; bowel sounds normal; no masses,  no organomegaly  Extremities: extremities normal, atraumatic, no cyanosis or edema  Pulses: 2+ and symmetric  Skin:Icteric, Ecchymoses present bilaterally on dorsum of hands and wrists      Labs  Lab Results   Component Value Date    COLORU yell clear 2013    SPECGRAV 1.015 2013    NITRITE n 2013    PROTEINUR n 2013    KETONESU n 2013    UROBILINOGEN n 2013    BILIRUBINUR n 2013         Radiology:  Imaging Results     None          Assessment:     Tatiana Thao is a 87 y.o. female with:  Patient Active Problem List    Diagnosis Date Noted    Pancreatitis due to biliary obstruction 2017    Mixed stress and urge urinary incontinence 2013        Plan:   Gallstone Pancreatitis  - -Pt. With persistent epigastric abdominal pain, elevated lipase, and CT  "findings consistent with gallstone pancreatitis  - Lipase: 1399 at OSH  - ALT: 139, AST: 175 at OSH  - Bilirubin: 5.3 OSH  -Glenn II score 8 ( 6 points for age, 2 points for hyponatremia)  -NPO for now will consider starting IV LR once CMP results  -Plan for ERCP tomorrow; (low risk procedure)    CAD w/ cardiomyopathy s/p AICD  -According to pt. Daughter, pt. Had evidence of old infarct on a "cardiology study"  -Pt. Also has AICD for unknown reason  -Pt. Has no active chest pain  -Ordered EKG (pt. With known LBBB) and troponin to evaluate for active ischemia in order to determine risk assessment for noncardiac surgery (pt. To have low risk procedure and is currently able to perform 1-4 METs, risk factors include h/o CAD & HF)  -Will Continue aspirin    Heart Failure  -Unknown EF, daughter reports that her  Told her she had "mild HF"  -Continue ACE and monitor for signs of fluid overload    HTN  -BP elevated on presentation, 184/76  -Pt. Not given BP meds while in [facility] ED today  -Will restart home meds tonight to lower BP  -After tonight, will continue with normal daily dosing    GERD  -Continue Pepcid, no acute issues    Urinary incontinence  -Continue home oxybutynin and flomax    CKD II  -Pt. With CKD II, daughter reports that this is chronic  -Cr in [facility] 0.59    Hyponatremia  -Na+ 128 at outside hospital, will reorder CMP and treat if necessary    Glaucoma  -Continue home eye drops    Ppx: heparin  Diet: NPO for procedure  Dispo: Pending ERCP tomorrow       Code Status:   Full    Vasu Fu  [facility] Internal Medicine HO-I  U IM Service    [facility] Medicine Hospitalist Pager numbers:   [facility] Hospitalist Medicine Team A (Elania/Lisette): [phone]  [facility] Hospitalist Medicine Team B (Saran/Anna):  [phone]    "

## 2017-01-07 NOTE — CONSULTS
Gastroenterology  CC: Abdominal pain    HPI 87 y.o. female who presents as a transfer from an outside hospital for evaluation and management of biliary pancreatitis.  The patient first experienced this pain 10-11 days ago described as an epigastric, severe pain which is sharp and nonradiating.  Associated with some nausea.  No changes in bowel habits.  No other associated symptoms are noticed.  Is without exacerbating or relieving factors.  She was admitted to the hospital suspected biliary pancreatitis.  She was treated with antibiotics and bowel rest and improved and was discharged home.  The family is present at the bedside as well to give additional history.  She then x-rays recurrence of the pain was readmitted to an outside hospital.  Today she is feeling somewhat better.  Laboratory work is significant person of elevated LFTs with a bilirubin of greater than 5.  She denies any similar symptoms in the past.    Past Medical History   Diagnosis Date    Asthma     Colon polyp     COPD (chronic obstructive pulmonary disease)     Coronary artery disease     Hypertension          Past Surgical History   Procedure Laterality Date    Hysterectomy      Back surgery      Appendectomy      Bladder surgery       bladder suspension    Oophorectomy         Social History  Social History   Substance Use Topics    Smoking status: Never Smoker    Smokeless tobacco: None    Alcohol use None         Family History   Problem Relation Age of Onset    Urolithiasis Neg Hx     Kidney cancer Neg Hx     Prostate cancer Neg Hx        Review of Systems  General ROS: negative for chills, fever or weight loss  Psychological ROS: negative for hallucination, depression or suicidal ideation  Ophthalmic ROS: negative for blurry vision, photophobia or eye pain  ENT ROS: negative for epistaxis, sore throat or rhinorrhea  Respiratory ROS: no cough, shortness of breath, or wheezing  Cardiovascular ROS: no chest pain or dyspnea on  exertion  Gastrointestinal ROS:no change in bowel habits, or black/ bloody stools  Genito-Urinary ROS: no dysuria, trouble voiding, or hematuria  Musculoskeletal ROS: negative for gait disturbance or muscular weakness  Neurological ROS: no syncope or seizures; no ataxia  Dermatological ROS: negative for pruritis, rash and jaundice    Physical Examination  Visit Vitals    BP (!) 149/70 (BP Method: Automatic)    Pulse 92    Temp 97.4 °F (36.3 °C) (Oral)    Resp 17    Ht 5' (1.524 m)    Wt 67.4 kg (148 lb 8 oz)    Breastfeeding No    BMI 29 kg/m2     General appearance: alert, cooperative, no distress  HENT: Normocephalic, atraumatic, neck symmetrical, no nasal discharge   Eyes: conjunctivae icteric, PERRL, EOM's intact  Lungs: clear to auscultation bilaterally, no dullness to percussion bilaterally  Heart: regular rate without rub; no displacement of the PMI   Abdomen: soft, mild ttp in epig; bowel sounds normoactive; no organomegaly  Extremities: extremities symmetric; no clubbing, cyanosis, or edema  Integument: Skin color, texture, turgor normal; no rashes; hair distrubution normal  Neurologic: Alert and oriented X 3, normal strength, normal coordination and gait  Psychiatric: no pressured speech; normal affect; no evidence of impaired cognition     Labs: TB 3.8    Imaging: Results reviewed of CT scan    Assessment/Plan: 88yo female with CAD, COPD with:    1. Elevated LFTs/Biliary pancreatitis   - plan for ERCP today   - d/w Dr. Syed as well   - monitor LFTs   - risks/benefits discussed in detail with patient and family at the bedside    - the understand the risks and are amenable to proceeding    - risk factors: CAD, defibrillator, COPD, advanced age

## 2017-01-07 NOTE — PROGRESS NOTES
Patient just returned to floor from Endoscopy.  No complaints of pain or has any shortness of breath.  Son at bedside.  Will continue to monitor.

## 2017-01-08 VITALS
DIASTOLIC BLOOD PRESSURE: 69 MMHG | HEIGHT: 60 IN | HEART RATE: 69 BPM | TEMPERATURE: 97 F | RESPIRATION RATE: 16 BRPM | WEIGHT: 148.5 LBS | OXYGEN SATURATION: 94 % | SYSTOLIC BLOOD PRESSURE: 162 MMHG | BODY MASS INDEX: 29.16 KG/M2

## 2017-01-08 LAB
ALBUMIN SERPL BCP-MCNC: 2.2 G/DL
ALP SERPL-CCNC: 240 U/L
ALT SERPL W/O P-5'-P-CCNC: 74 U/L
ANION GAP SERPL CALC-SCNC: 9 MMOL/L
AST SERPL-CCNC: 35 U/L
BILIRUB SERPL-MCNC: 1.7 MG/DL
BUN SERPL-MCNC: 15 MG/DL
CALCIUM SERPL-MCNC: 9.4 MG/DL
CHLORIDE SERPL-SCNC: 102 MMOL/L
CO2 SERPL-SCNC: 25 MMOL/L
CREAT SERPL-MCNC: 0.8 MG/DL
EST. GFR  (AFRICAN AMERICAN): >60 ML/MIN/1.73 M^2
EST. GFR  (NON AFRICAN AMERICAN): >60 ML/MIN/1.73 M^2
GLUCOSE SERPL-MCNC: 177 MG/DL
POCT GLUCOSE: 176 MG/DL (ref 70–110)
POTASSIUM SERPL-SCNC: 4.6 MMOL/L
PROT SERPL-MCNC: 6.4 G/DL
SODIUM SERPL-SCNC: 136 MMOL/L

## 2017-01-08 PROCEDURE — 25000003 PHARM REV CODE 250

## 2017-01-08 PROCEDURE — 25000003 PHARM REV CODE 250: Performed by: HOSPITALIST

## 2017-01-08 PROCEDURE — 36415 COLL VENOUS BLD VENIPUNCTURE: CPT

## 2017-01-08 PROCEDURE — 25000003 PHARM REV CODE 250: Performed by: ANESTHESIOLOGY

## 2017-01-08 PROCEDURE — 99214 OFFICE O/P EST MOD 30 MIN: CPT | Mod: ,,, | Performed by: INTERNAL MEDICINE

## 2017-01-08 PROCEDURE — G0378 HOSPITAL OBSERVATION PER HR: HCPCS

## 2017-01-08 PROCEDURE — 80053 COMPREHEN METABOLIC PANEL: CPT

## 2017-01-08 PROCEDURE — 94761 N-INVAS EAR/PLS OXIMETRY MLT: CPT

## 2017-01-08 RX ADMIN — FAMOTIDINE 20 MG: 20 TABLET, FILM COATED ORAL at 08:01

## 2017-01-08 RX ADMIN — BRINZOLAMIDE 1 DROP: 10 SUSPENSION/ DROPS OPHTHALMIC at 08:01

## 2017-01-08 RX ADMIN — HEPARIN SODIUM 5000 UNITS: 5000 INJECTION, SOLUTION INTRAVENOUS; SUBCUTANEOUS at 08:01

## 2017-01-08 RX ADMIN — SODIUM CHLORIDE, PRESERVATIVE FREE 3 ML: 5 INJECTION INTRAVENOUS at 07:01

## 2017-01-08 RX ADMIN — POTASSIUM & SODIUM PHOSPHATES POWDER PACK 280-160-250 MG 1 PACKET: 280-160-250 PACK at 07:01

## 2017-01-08 RX ADMIN — AMLODIPINE BESYLATE 5 MG: 5 TABLET ORAL at 08:01

## 2017-01-08 RX ADMIN — CETIRIZINE HYDROCHLORIDE 5 MG: 5 TABLET, FILM COATED ORAL at 08:01

## 2017-01-08 NOTE — PLAN OF CARE
Problem: Patient Care Overview  Goal: Plan of Care Review  Outcome: Revised  Patient is awake, alert, and oriented.  Lying bed in bed sleeping on and off.  Does not speak very much.  Family members at bedside.  Patient went to Endo for ERCP.  Patient received all K+riders.  Voiced no complaints.  Safety maintained.  Will continue to monitor.

## 2017-01-08 NOTE — PROGRESS NOTES
Gastroenterology  CC: Abdominal pain    HPI 87 y.o. female - doing well, no melena or fever. No abd pain. Mario diet. Good UOP. Family at bedside.       Past Medical History   Diagnosis Date    Asthma     Colon polyp     COPD (chronic obstructive pulmonary disease)     Coronary artery disease     Hypertension          Review of Systems  General ROS: negative for chills, fever or weight loss  Cardiovascular ROS: no chest pain or dyspnea on exertion  Gastrointestinal ROS: no abdominal pain, change in bowel habits, or black/ bloody stools    Physical Examination  Visit Vitals    BP (!) 162/69 (BP Method: Automatic)    Pulse 69    Temp 97.1 °F (36.2 °C) (Oral)    Resp 16    Ht 5' (1.524 m)    Wt 67.4 kg (148 lb 8 oz)    SpO2 (!) 94%    Breastfeeding No    BMI 29 kg/m2     General appearance: alert, cooperative, no distress  HENT: Normocephalic, atraumatic, neck symmetrical, no nasal discharge   Lungs: clear to auscultation bilaterally, no dullness to percussion bilaterally  Heart: regular rate and rhythm without rub; no displacement of the PMI   Abdomen: soft, non-tender; bowel sounds normoactive; no organomegaly  Extremities: extremities symmetric; no clubbing, cyanosis, or edema  Neurologic: Alert and oriented X 3, normal strength, normal coordination and gait    Labs: Pending    Imaging: Cholangiogram reviewed    Assessment/Plan: 86yo female with:    1. Elevated LFTs/Choledocholithiasis   - doing well   - no s/s of complications   - s/p ercp with sphincterotomy/stone extraction   - ok to d/c from my standpoint   - discussed s/s for which she should present back to hospital/call   - can f/u as outpt to ensure liver function returns to normal   - clinically doing well

## 2017-01-08 NOTE — PLAN OF CARE
Problem: Patient Care Overview  Goal: Plan of Care Review  Outcome: Ongoing (interventions implemented as appropriate)  Patient remains free from falls. Tolerating Clears well advanced to a regular diet for breakfast. Patient ambulated to the bathroom with assistance. Medicated for abdominal pain once, No other problems noted.  Goal: Individualization & Mutuality  Outcome: Ongoing (interventions implemented as appropriate)

## 2017-01-08 NOTE — DISCHARGE SUMMARY
[facility] IM Discharge Summary    Primary Team: [facility] IM Team B  Attending Physician: Uziel Delaney MD  Resident: Vonda  Intern: Pete    Date of Admit: 1/6/2017  Date of Discharge: 1/8/2017    Discharge to: Home  Condition: Stable    Discharge Diagnoses     Patient Active Problem List   Diagnosis    Pancreatitis due to biliary obstruction    LBBB (left bundle branch block)    HTN (hypertension)    COPD (chronic obstructive pulmonary disease)    Asthma    H/O colonoscopy with polypectomy    CAD (coronary artery disease)    Constipation    Urethral diverticulum    Zenker diverticulum    ANDREW (iron deficiency anemia)    Heart failure of unknown type    CKD (chronic kidney disease) stage 2, GFR 60-89 ml/min    AICD (automatic cardioverter/defibrillator) present    Abdominal pain    Diarrhea    Jaundice    Mitral regurgitation    Cardiomyopathy    Total bilirubin, elevated    Transaminitis    Glaucoma    Urinary incontinence    Leukocytosis    Normocytic anemia    Thrombocytosis    Hyperglycemia    Hypomagnesemia    Hypophosphatemia       Consultants and Procedures     Consultants:  Gastroenterology    Procedures:   ERCP    Brief History of Present Illness      Tatiana Thao is a 87 y.o. female who  has a past medical history of Asthma; Colon polyp; COPD (chronic obstructive pulmonary disease); Coronary artery disease; and Hypertension.  The patient presented to [facility] on 1/6/2017 with a primary complaint of No chief complaint on file.  .     Patient presents as transfer from [facility] after initially presented there for gallstone pancreatitis. Patient was initially treated with non-procedural management. Patient initially improved but eventually experienced an acute worsening of symptoms. Patient was transferred to [facility] for endoscopic intervention.    For the full HPI please refer to the History & Physical from this  admission.    Hospital Course By Problem with Pertinent Findings     1. Biliary Pancreatitis  Patient with history of gallstone pancreatitis many years prior presented to OSH with persistent epigastric abdominal pain, elevated lipase, and CT findings consistent with recurrence of gallstone pancreatitis. Iliamna II score was 8 at admit. Patient was initially treated with antibiotics and symptomatic control which resulted in clinical improvement. However, patient reported acute worsening of symptoms. Unfortunately, endoscopic intervention was unable to be performed at OSH 2/2 equipment malfunction. Therefore, patient was transferred to [facility]. GI was consulted for assistance as well. Patient underwent ERCP with sphincterotomy and stone extraction. Patient was observed overnight to assess for post-op complications and continued clinical improvement.     2. CAD w/ Cardiomyopathy s/p AICD  Per patient's daughter, the patient has evidence of old infarct on an unspecified type of cardiology study. Additionally, patient has AICD, though family is unaware of exact reason. Serial cardiac enzymes and EKGs were obtained and were unremarkable.    3. Anemia of Chronic Disease  Patient presented with normocytic anemia and no reports of bleeding. Anemia w/u was obtained as no previous studies were available. Work up was consistent with AoCD.    Discharge Medications        Medication List      CONTINUE taking these medications          ALLEGRA ORAL       brinzolamide 1 % ophthalmic suspension   Commonly known as:  AZOPT       co-enzyme Q-10 30 mg capsule       docusate sodium 100 MG capsule   Commonly known as:  COLACE       famotidine 20 MG tablet   Commonly known as:  PEPCID       furosemide 20 MG tablet   Commonly known as:  LASIX       latanoprost 0.005 % ophthalmic solution       magnesium oxide 400 mg tablet   Commonly known as:  MAG-OX       melatonin 3 mg Tab       NORVASC ORAL       oxybutynin 5 MG Tr24   Commonly known as:   DITROPAN-XL       quetiapine 100 MG Tab   Commonly known as:  SEROQUEL       ramipril 5 MG capsule   Commonly known as:  ALTACE       tamsulosin 0.4 mg Cp24   Commonly known as:  FLOMAX       VITAMIN D2 50,000 unit Cap   Generic drug:  ergocalciferol       VITAMIN D3 2,000 unit Cap   Generic drug:  cholecalciferol (vitamin D3)             Discharge Information:   Diet:  Diabetic, Cardiac    Physical Activity:  As tolerated    Instructions:  1. Take all medications as prescribed  2. Keep all follow-up appointments  3. Return to the hospital or call your primary care physicians if any worsening symptoms such as fevers, chills, intractable nausea/vomiting, severe abdominal pain occur.      Follow-Up Appointments:  Gastroenterology  PCP    Ashlie Szymanski  [facility] Internal Medicine, [place]

## 2017-01-08 NOTE — PLAN OF CARE
Problem: Patient Care Overview  Goal: Plan of Care Review  Outcome: Ongoing (interventions implemented as appropriate)  RA SAT 95%; no distress noted.

## 2017-01-08 NOTE — PLAN OF CARE
Discharge orders noted. No HH or DME.       01/08/17 0822   Final Note   Assessment Type Final Discharge Note   Discharge Disposition Home   What phone number can be called within the next 1-3 days to see how you are doing after discharge? [phone]   Hospital Follow Up  Appt(s) scheduled? No  (offices closed on weekend, pt to call for follow up as soon as possible with Dr. Ley/internal medicine for 2 wks, Dr. Syed/Gastroenterology for 1 wk)   Offered [facility] -- Bedside Delivery? n/a   Discharge/Hospital Encounter Summary to (non-[facility]) PCP Yes   Referral to Outpatient Case Management complete? Yes   Referral to / orders for Home Health Complete? n/a   30 day supply of medicines given at discharge, if documented non-compliance / non-adherence? n/a   Any social issues identified prior to discharge? n/a   Did you assess the readiness or willingness of the family or caregiver to support self management of care? n/a   Right Care Referral Info   Post Acute Recommendation No Care     Leticia Ayala RN Transitional Navigator  [phone]

## 2017-01-08 NOTE — PROGRESS NOTES
Patient is being discharged home per MD.  Discharge instructions on medications and follow-up visits are given to the patient's son.  Patient's son verbalized complete understanding on the above discharge instructions.  Will go to [facility] per wheelchair.  Son is at bedside and will accompany patient home.  Voiced no other concerns.  Safety maintained.

## 2017-01-09 ENCOUNTER — OUTPATIENT CASE MANAGEMENT (OUTPATIENT)
Dept: ADMINISTRATIVE | Facility: OTHER | Age: 82
End: 2017-01-09

## 2017-01-09 NOTE — PROGRESS NOTES
Please note the following patient has been assigned to Megan Joyner RN CCM,  with Outpatient Complex Care Mgmt for screening.    Please contact [facility] at ext [phone] with questions.    Thank you    Xiomara Singleton, SSC

## 2017-01-10 ENCOUNTER — OUTPATIENT CASE MANAGEMENT (OUTPATIENT)
Dept: ADMINISTRATIVE | Facility: OTHER | Age: 82
End: 2017-01-10

## 2017-01-10 NOTE — PROGRESS NOTES
01/10/17-Called and spoke to Tatiana Thao, 87 year old female and her son Finesse Thao. Patient is going to see her PCP tomorrow for a follow up appt. Finesse states that the GI doctor told them that as long as she saw her PCP she did not need to follow up with him. Patient's doctor is Dr. Edwards in [place] and is not an [facility] doctor. Will mail patient resource book, [facility] brochure and my card. Patient with no needs for OPCM services at this time. Will dis-enroll patient from [facility].

## 2017-01-19 ENCOUNTER — HISTORICAL (OUTPATIENT)
Dept: ADMINISTRATIVE | Facility: HOSPITAL | Age: 82
End: 2017-01-19

## 2017-01-19 LAB
BASOPHILS NFR BLD: 0 K/UL (ref 0–0.2)
BASOPHILS NFR BLD: 0.5 %
BUN SERPL-MCNC: 16 MG/DL (ref 8–20)
CALCIUM SERPL-MCNC: 9.4 MG/DL (ref 7.7–10.4)
CHLORIDE: 99 MMOL/L (ref 98–110)
CO2 SERPL-SCNC: 28.6 MMOL/L (ref 22.8–31.6)
CREATININE: 1.06 MG/DL (ref 0.6–1.4)
EOSINOPHIL NFR BLD: 0.2 K/UL (ref 0–0.7)
EOSINOPHIL NFR BLD: 2.5 %
ERYTHROCYTE [DISTWIDTH] IN BLOOD BY AUTOMATED COUNT: 18.2 % (ref 11.7–14.9)
GLUCOSE: 266 MG/DL (ref 70–99)
GRAN #: 4.1 K/UL (ref 1.4–6.5)
GRAN%: 62.3 %
HCT VFR BLD AUTO: 33 % (ref 36–48)
HGB BLD-MCNC: 10.3 G/DL (ref 12–15)
IMMATURE GRANS (ABS): 0 K/UL (ref 0–1)
IMMATURE GRANULOCYTES: 0.3 %
LYMPH #: 1.8 K/UL (ref 1.2–3.4)
LYMPH%: 28.2 %
MCH RBC QN AUTO: 26.9 PG (ref 25–35)
MCHC RBC AUTO-ENTMCNC: 31.2 G/DL (ref 31–36)
MCV RBC AUTO: 86.2 FL (ref 79–98)
MONO #: 0.4 K/UL (ref 0.1–0.6)
MONO%: 6.2 %
NUCLEATED RBCS: 0 %
PLATELET # BLD AUTO: 302 K/UL (ref 140–440)
PMV BLD AUTO: 10.2 FL (ref 8.8–12.7)
POTASSIUM SERPL-SCNC: 4.2 MMOL/L (ref 3.5–5)
RBC # BLD AUTO: 3.83 M/UL (ref 3.5–5.5)
SODIUM: 138 MMOL/L (ref 134–144)
WBC # BLD AUTO: 6.5 K/UL (ref 5–10)

## 2017-01-23 LAB
GLUCOSE SERPL-MCNC: 191 MG/DL (ref 70–99)
GLUCOSE SERPL-MCNC: 195 MG/DL (ref 70–99)
GLUCOSE SERPL-MCNC: 309 MG/DL (ref 70–99)

## 2017-01-24 LAB — GLUCOSE SERPL-MCNC: 136 MG/DL (ref 70–99)

## 2019-02-06 ENCOUNTER — HOSPITAL ENCOUNTER (OUTPATIENT)
Facility: HOSPITAL | Age: 84
Discharge: NURSING, REHAB OR LONG-TERM CARE | End: 2019-02-08
Attending: INTERNAL MEDICINE | Admitting: INTERNAL MEDICINE
Payer: MEDICARE

## 2019-02-06 DIAGNOSIS — R55 SYNCOPE: ICD-10-CM

## 2019-02-06 LAB
ALBUMIN SERPL BCP-MCNC: 3.5 G/DL
ALP SERPL-CCNC: 61 U/L
ALT SERPL W/O P-5'-P-CCNC: 18 U/L
ANION GAP SERPL CALC-SCNC: 11 MMOL/L
AST SERPL-CCNC: 17 U/L
BASOPHILS # BLD AUTO: 0.02 K/UL
BASOPHILS NFR BLD: 0.4 %
BILIRUB SERPL-MCNC: 1.1 MG/DL
BUN SERPL-MCNC: 55 MG/DL
CALCIUM SERPL-MCNC: 10.7 MG/DL
CHLORIDE SERPL-SCNC: 101 MMOL/L
CO2 SERPL-SCNC: 24 MMOL/L
CREAT SERPL-MCNC: 1.1 MG/DL
DIFFERENTIAL METHOD: ABNORMAL
EOSINOPHIL # BLD AUTO: 0.2 K/UL
EOSINOPHIL NFR BLD: 3.4 %
ERYTHROCYTE [DISTWIDTH] IN BLOOD BY AUTOMATED COUNT: 13.1 %
EST. GFR  (AFRICAN AMERICAN): 51.1 ML/MIN/1.73 M^2
EST. GFR  (NON AFRICAN AMERICAN): 44.3 ML/MIN/1.73 M^2
GLUCOSE SERPL-MCNC: 111 MG/DL
HCT VFR BLD AUTO: 35.3 %
HGB BLD-MCNC: 11.5 G/DL
IMM GRANULOCYTES # BLD AUTO: 0.01 K/UL
IMM GRANULOCYTES NFR BLD AUTO: 0.2 %
LYMPHOCYTES # BLD AUTO: 1.4 K/UL
LYMPHOCYTES NFR BLD: 29.9 %
MAGNESIUM SERPL-MCNC: 1.5 MG/DL
MCH RBC QN AUTO: 28.5 PG
MCHC RBC AUTO-ENTMCNC: 32.6 G/DL
MCV RBC AUTO: 88 FL
MONOCYTES # BLD AUTO: 0.6 K/UL
MONOCYTES NFR BLD: 13.1 %
NEUTROPHILS # BLD AUTO: 2.5 K/UL
NEUTROPHILS NFR BLD: 53 %
NRBC BLD-RTO: 0 /100 WBC
PHOSPHATE SERPL-MCNC: 3.9 MG/DL
PLATELET # BLD AUTO: 155 K/UL
PMV BLD AUTO: 11.7 FL
POTASSIUM SERPL-SCNC: 4.1 MMOL/L
PROT SERPL-MCNC: 6.7 G/DL
RBC # BLD AUTO: 4.03 M/UL
SODIUM SERPL-SCNC: 136 MMOL/L
T4 FREE SERPL-MCNC: 4.01 NG/DL
TROPONIN I SERPL DL<=0.01 NG/ML-MCNC: 0.06 NG/ML
TSH SERPL DL<=0.005 MIU/L-ACNC: <0.01 UIU/ML
WBC # BLD AUTO: 4.75 K/UL

## 2019-02-06 PROCEDURE — 63600175 PHARM REV CODE 636 W HCPCS

## 2019-02-06 PROCEDURE — G0378 HOSPITAL OBSERVATION PER HR: HCPCS

## 2019-02-06 PROCEDURE — 80053 COMPREHEN METABOLIC PANEL: CPT

## 2019-02-06 PROCEDURE — 70450 CT HEAD WITHOUT CONTRAST: ICD-10-PCS | Mod: 26,,, | Performed by: RADIOLOGY

## 2019-02-06 PROCEDURE — 63600175 PHARM REV CODE 636 W HCPCS: Performed by: INTERNAL MEDICINE

## 2019-02-06 PROCEDURE — 85025 COMPLETE CBC W/AUTO DIFF WBC: CPT

## 2019-02-06 PROCEDURE — 83735 ASSAY OF MAGNESIUM: CPT

## 2019-02-06 PROCEDURE — 84443 ASSAY THYROID STIM HORMONE: CPT

## 2019-02-06 PROCEDURE — G0379 DIRECT REFER HOSPITAL OBSERV: HCPCS

## 2019-02-06 PROCEDURE — 84484 ASSAY OF TROPONIN QUANT: CPT

## 2019-02-06 PROCEDURE — 71045 X-RAY EXAM CHEST 1 VIEW: CPT | Mod: 26,,, | Performed by: RADIOLOGY

## 2019-02-06 PROCEDURE — 71045 XR CHEST AP PORTABLE: ICD-10-PCS | Mod: 26,,, | Performed by: RADIOLOGY

## 2019-02-06 PROCEDURE — 70450 CT HEAD/BRAIN W/O DYE: CPT | Mod: 26,,, | Performed by: RADIOLOGY

## 2019-02-06 PROCEDURE — 71045 X-RAY EXAM CHEST 1 VIEW: CPT | Mod: TC,FY

## 2019-02-06 PROCEDURE — 84439 ASSAY OF FREE THYROXINE: CPT

## 2019-02-06 PROCEDURE — 70450 CT HEAD/BRAIN W/O DYE: CPT | Mod: TC

## 2019-02-06 PROCEDURE — 93005 ELECTROCARDIOGRAM TRACING: CPT

## 2019-02-06 PROCEDURE — C9113 INJ PANTOPRAZOLE SODIUM, VIA: HCPCS | Performed by: INTERNAL MEDICINE

## 2019-02-06 PROCEDURE — 25000003 PHARM REV CODE 250: Performed by: INTERNAL MEDICINE

## 2019-02-06 PROCEDURE — 36415 COLL VENOUS BLD VENIPUNCTURE: CPT

## 2019-02-06 PROCEDURE — 84100 ASSAY OF PHOSPHORUS: CPT

## 2019-02-06 RX ORDER — LORAZEPAM 2 MG/ML
INJECTION INTRAMUSCULAR
Status: COMPLETED
Start: 2019-02-06 | End: 2019-02-06

## 2019-02-06 RX ORDER — LORAZEPAM 2 MG/ML
1 INJECTION INTRAMUSCULAR ONCE
Status: COMPLETED | OUTPATIENT
Start: 2019-02-06 | End: 2019-02-06

## 2019-02-06 RX ORDER — FAMOTIDINE 20 MG/1
20 TABLET, FILM COATED ORAL 2 TIMES DAILY
Status: DISCONTINUED | OUTPATIENT
Start: 2019-02-06 | End: 2019-02-08 | Stop reason: HOSPADM

## 2019-02-06 RX ORDER — BRINZOLAMIDE 10 MG/ML
1 SUSPENSION/ DROPS OPHTHALMIC 2 TIMES DAILY
Status: DISCONTINUED | OUTPATIENT
Start: 2019-02-06 | End: 2019-02-08 | Stop reason: HOSPADM

## 2019-02-06 RX ORDER — ASPIRIN 81 MG/1
81 TABLET ORAL DAILY
Status: DISCONTINUED | OUTPATIENT
Start: 2019-02-06 | End: 2019-02-08 | Stop reason: HOSPADM

## 2019-02-06 RX ORDER — LATANOPROST 50 UG/ML
1 SOLUTION/ DROPS OPHTHALMIC NIGHTLY
Status: DISCONTINUED | OUTPATIENT
Start: 2019-02-06 | End: 2019-02-08 | Stop reason: HOSPADM

## 2019-02-06 RX ORDER — AMLODIPINE BESYLATE 2.5 MG/1
5 TABLET ORAL DAILY
Status: DISCONTINUED | OUTPATIENT
Start: 2019-02-06 | End: 2019-02-08

## 2019-02-06 RX ORDER — CHOLECALCIFEROL (VITAMIN D3) 50 MCG
2000 TABLET ORAL DAILY
Status: DISCONTINUED | OUTPATIENT
Start: 2019-02-06 | End: 2019-02-08 | Stop reason: HOSPADM

## 2019-02-06 RX ORDER — PANTOPRAZOLE SODIUM 40 MG/10ML
40 INJECTION, POWDER, LYOPHILIZED, FOR SOLUTION INTRAVENOUS DAILY
Status: DISCONTINUED | OUTPATIENT
Start: 2019-02-06 | End: 2019-02-08 | Stop reason: HOSPADM

## 2019-02-06 RX ORDER — FUROSEMIDE 20 MG/1
20 TABLET ORAL DAILY
Status: DISCONTINUED | OUTPATIENT
Start: 2019-02-06 | End: 2019-02-08 | Stop reason: HOSPADM

## 2019-02-06 RX ORDER — HALOPERIDOL 5 MG/ML
5 INJECTION INTRAMUSCULAR ONCE
Status: COMPLETED | OUTPATIENT
Start: 2019-02-06 | End: 2019-02-06

## 2019-02-06 RX ORDER — LISINOPRIL 2.5 MG/1
2.5 TABLET ORAL DAILY
Status: DISCONTINUED | OUTPATIENT
Start: 2019-02-06 | End: 2019-02-08 | Stop reason: HOSPADM

## 2019-02-06 RX ORDER — METOPROLOL SUCCINATE 25 MG/1
25 TABLET, EXTENDED RELEASE ORAL DAILY
Status: DISCONTINUED | OUTPATIENT
Start: 2019-02-06 | End: 2019-02-08 | Stop reason: HOSPADM

## 2019-02-06 RX ORDER — CALCITRIOL 0.25 UG/1
0.25 CAPSULE ORAL DAILY
Status: DISCONTINUED | OUTPATIENT
Start: 2019-02-06 | End: 2019-02-08 | Stop reason: HOSPADM

## 2019-02-06 RX ORDER — TAMSULOSIN HYDROCHLORIDE 0.4 MG/1
0.4 CAPSULE ORAL DAILY
Status: DISCONTINUED | OUTPATIENT
Start: 2019-02-06 | End: 2019-02-08 | Stop reason: HOSPADM

## 2019-02-06 RX ORDER — SODIUM CHLORIDE 9 MG/ML
INJECTION, SOLUTION INTRAVENOUS CONTINUOUS
Status: DISCONTINUED | OUTPATIENT
Start: 2019-02-06 | End: 2019-02-08 | Stop reason: HOSPADM

## 2019-02-06 RX ORDER — QUETIAPINE FUMARATE 100 MG/1
100 TABLET, FILM COATED ORAL NIGHTLY
Status: DISCONTINUED | OUTPATIENT
Start: 2019-02-06 | End: 2019-02-07

## 2019-02-06 RX ADMIN — METOPROLOL SUCCINATE 25 MG: 25 TABLET, EXTENDED RELEASE ORAL at 12:02

## 2019-02-06 RX ADMIN — FAMOTIDINE 20 MG: 20 TABLET ORAL at 12:02

## 2019-02-06 RX ADMIN — CHOLECALCIFEROL (VITAMIN D3) 10 MCG (400 UNIT) TABLET 2000 UNITS: at 12:02

## 2019-02-06 RX ADMIN — BRINZOLAMIDE 1 DROP: 10 SUSPENSION/ DROPS OPHTHALMIC at 08:02

## 2019-02-06 RX ADMIN — FUROSEMIDE 20 MG: 20 TABLET ORAL at 12:02

## 2019-02-06 RX ADMIN — HALOPERIDOL LACTATE 5 MG: 5 INJECTION, SOLUTION INTRAMUSCULAR at 02:02

## 2019-02-06 RX ADMIN — SITAGLIPTIN 100 MG: 25 TABLET, FILM COATED ORAL at 12:02

## 2019-02-06 RX ADMIN — LISINOPRIL 2.5 MG: 2.5 TABLET ORAL at 12:02

## 2019-02-06 RX ADMIN — LORAZEPAM 2 MG: 2 INJECTION, SOLUTION INTRAMUSCULAR; INTRAVENOUS at 09:02

## 2019-02-06 RX ADMIN — TAMSULOSIN HYDROCHLORIDE 0.4 MG: 0.4 CAPSULE ORAL at 12:02

## 2019-02-06 RX ADMIN — GABAPENTIN 400 MG: 100 CAPSULE ORAL at 08:02

## 2019-02-06 RX ADMIN — CALCITRIOL 0.25 MCG: 0.25 CAPSULE, LIQUID FILLED ORAL at 12:02

## 2019-02-06 RX ADMIN — GABAPENTIN 400 MG: 100 CAPSULE ORAL at 12:02

## 2019-02-06 RX ADMIN — APIXABAN 2.5 MG: 2.5 TABLET, FILM COATED ORAL at 12:02

## 2019-02-06 RX ADMIN — ASPIRIN 81 MG: 81 TABLET, COATED ORAL at 12:02

## 2019-02-06 RX ADMIN — SODIUM CHLORIDE 1000 ML: 9 INJECTION, SOLUTION INTRAVENOUS at 12:02

## 2019-02-06 RX ADMIN — QUETIAPINE FUMARATE 100 MG: 100 TABLET ORAL at 08:02

## 2019-02-06 RX ADMIN — PANTOPRAZOLE SODIUM 40 MG: 40 INJECTION, POWDER, LYOPHILIZED, FOR SOLUTION INTRAVENOUS at 12:02

## 2019-02-06 RX ADMIN — APIXABAN 2.5 MG: 2.5 TABLET, FILM COATED ORAL at 08:02

## 2019-02-06 RX ADMIN — FAMOTIDINE 20 MG: 20 TABLET ORAL at 08:02

## 2019-02-06 RX ADMIN — AMLODIPINE BESYLATE 5 MG: 2.5 TABLET ORAL at 12:02

## 2019-02-06 NOTE — PLAN OF CARE
02/06/19 1050   Discharge Assessment   Assessment Type Discharge Planning Assessment   Confirmed/corrected address and phone number on facesheet? Yes   Assessment information obtained from? Caregiver   Expected Length of Stay (days) 2   Communicated expected length of stay with patient/caregiver yes   Prior to hospitilization cognitive status: Not Oriented to Time   Prior to hospitalization functional status: Needs Assistance   Current cognitive status: Not Oriented to Time   Current Functional Status: Needs Assistance   Lives With child(divina), adult   Able to Return to Prior Arrangements yes   Is patient able to care for self after discharge? No   Who are your caregiver(s) and their phone number(s)? Tiffany marinelli [phone]   Patient's perception of discharge disposition home health   Readmission Within the Last 30 Days no previous admission in last 30 days   Patient currently being followed by outpatient case management? No   Patient currently receives any other outside agency services? Yes   How many hours a day does the patient receive services? 1   Name and contact number of agency or person providing outside services [facility]; Meals on Wheels; Housekeeping services    Equipment Currently Used at Home bedside commode;walker, rolling;other (see comments)  (walking sticks)   Do you have any problems affording any of your prescribed medications? No   Is the patient taking medications as prescribed? yes   Does the patient have transportation home? Yes   Transportation Anticipated family or friend will provide   Does the patient receive services at the [facility]? No   Discharge Plan [facility] Home [facility]   DME Needed Upon Discharge  none   Patient/Family in Agreement with Plan yes

## 2019-02-06 NOTE — NURSING
ADMIT TO  VIA STRETCHER FROM [place] PER [facility]. PT TOLERATED WELL. DAUGHTER PRESENT AT BEDSIDE. PT CALM COOPERATIVE CONFUSED. DAUGHTER STATES I WILL BE HERE WITH HER MOST OF THE TIME OR MY BROTHER WILL BE HERE.

## 2019-02-06 NOTE — PLAN OF CARE
Patient's daughter & son in law at bedside. Patient has dementia but does know her name & that she is in the hospital. Patient's children stay with her around the clock since December.  States she walks at home sometimes with a walker or her walking sticks or by holding on to things. She gets home home nurse once week & ST 1-2/wk with [facility]. Daughter states would like a new home health co.She does have housekeeping services & meals on wheels. At this time the plan is to return home with a new home health company. Will continue to follow for needs.

## 2019-02-06 NOTE — SUBJECTIVE & OBJECTIVE
Past Medical History:   Diagnosis Date    Asthma     CHF (congestive heart failure)     Colon polyp     COPD (chronic obstructive pulmonary disease)     Coronary artery disease     Diabetes mellitus     Hypertension        Past Surgical History:   Procedure Laterality Date    APPENDECTOMY      BACK SURGERY      BLADDER SURGERY      bladder suspension    ERCP N/A 1/7/2017    Performed by [facility] at [facility]O    HYSTERECTOMY      OOPHORECTOMY         Review of patient's allergies indicates:   Allergen Reactions    Adhesive Other (See Comments)    Demerol [meperidine]     Dilaudid [hydromorphone] Hallucinations    Elavil [amitriptyline] Other (See Comments)    Morphine Other (See Comments)     hallucinations    Prednisone      Other reaction(s): Unknown       No current facility-administered medications on file prior to encounter.      Current Outpatient Medications on File Prior to Encounter   Medication Sig    AMLODIPINE BESYLATE (NORVASC ORAL) Take 5 mg by mouth once daily.     famotidine (PEPCID) 20 MG tablet Take 20 mg by mouth 2 (two) times daily.    furosemide (LASIX) 20 MG tablet Take 20 mg by mouth once daily. 1 Tablet Oral Every other day    ramipril (ALTACE) 5 MG capsule Take 2.5 mg by mouth after dinner.     tamsulosin (FLOMAX) 0.4 mg Cp24 Take 0.4 mg by mouth once daily.    brinzolamide (AZOPT) 1 % ophthalmic suspension Place 1 drop into both eyes 2 (two) times daily.    cholecalciferol, vitamin D3, (VITAMIN D3) 2,000 unit Cap Take 2,000 Units by mouth once daily.    ergocalciferol (VITAMIN D2) 50,000 unit Cap Take 50,000 Units by mouth every 14 (fourteen) days.    latanoprost 0.005 % ophthalmic solution Place 1 drop into both eyes every evening.    quetiapine (SEROQUEL) 100 MG Tab Take 100 mg by mouth every evening.    [DISCONTINUED] co-enzyme Q-10 30 mg capsule Take 100 mg by mouth once daily. Up to 500mg    [DISCONTINUED] docusate sodium (COLACE) 100 MG capsule  Take 100 mg by mouth 2 (two) times daily.    [DISCONTINUED] FEXOFENADINE HCL (ALLEGRA ORAL) Take 180 mg by mouth once daily.     [DISCONTINUED] magnesium oxide (MAG-OX) 400 mg tablet Take 400 mg by mouth once daily.    [DISCONTINUED] melatonin 3 mg Tab Take 3 mg by mouth every evening.    [DISCONTINUED] oxybutynin (DITROPAN-XL) 5 MG TR24 Take 5 mg by mouth once daily.     Family History     Problem Relation (Age of Onset)    Diabetes Mother, Father, Sister, Brother, Daughter    Hypertension Mother, Sister, Brother, Daughter    Stroke Mother        Tobacco Use    Smoking status: Never Smoker   Substance and Sexual Activity    Alcohol use: No     Frequency: Never    Drug use: No    Sexual activity: No     Review of Systems   Constitutional: Positive for activity change. Negative for appetite change, fatigue and fever.   HENT: Negative for congestion, ear discharge, mouth sores, nosebleeds, rhinorrhea, sinus pressure, sinus pain and tinnitus.    Eyes: Negative.  Negative for pain, redness and itching.   Respiratory: Negative for apnea, cough, choking, chest tightness, shortness of breath, wheezing and stridor.    Cardiovascular: Negative for chest pain, palpitations and leg swelling.   Gastrointestinal: Negative for abdominal distention, abdominal pain, anal bleeding, blood in stool, constipation and diarrhea.   Endocrine: Negative.    Genitourinary: Negative for difficulty urinating, flank pain, frequency and urgency.   Musculoskeletal: Negative for arthralgias, back pain, gait problem and myalgias.   Skin: Negative for color change and pallor.   Allergic/Immunologic: Negative.    Neurological: Positive for dizziness, syncope, weakness and light-headedness. Negative for facial asymmetry and headaches.   Hematological: Negative for adenopathy. Does not bruise/bleed easily.   Psychiatric/Behavioral: Positive for agitation. The patient is nervous/anxious.      Objective:     Vital Signs (Most Recent):  Temp: 96.9  °F (36.1 °C) (02/06/19 1029)  Pulse: 82 (02/06/19 1029)  Resp: 18 (02/06/19 1029)  BP: 136/80 (02/06/19 1029)  SpO2: (!) 94 % (02/06/19 1029) Vital Signs (24h Range):  Temp:  [96.6 °F (35.9 °C)-97.3 °F (36.3 °C)] 96.9 °F (36.1 °C)  Pulse:  [] 82  Resp:  [16-18] 18  SpO2:  [94 %-98 %] 94 %  BP: (112-150)/(51-80) 136/80     Weight: 55.9 kg (123 lb 3.2 oz)  Body mass index is 25.75 kg/m².    Physical Exam   Constitutional: She is oriented to person, place, and time. She appears well-developed and well-nourished.   HENT:   Head: Normocephalic and atraumatic.   Eyes: EOM are normal. Pupils are equal, round, and reactive to light.   Neck: Normal range of motion. Neck supple. No tracheal deviation present. No thyromegaly present.   Cardiovascular: Normal rate, regular rhythm and normal heart sounds.   Pulmonary/Chest: Effort normal and breath sounds normal.   Abdominal: Soft. Bowel sounds are normal. She exhibits no distension. There is no tenderness. There is no rebound and no guarding.   Musculoskeletal: Normal range of motion.   Lymphadenopathy:     She has no cervical adenopathy.   Neurological: She is alert and oriented to person, place, and time. She exhibits abnormal muscle tone. Coordination abnormal.   Skin: Skin is warm and dry. Capillary refill takes less than 2 seconds.   Psychiatric: She has a normal mood and affect. Her behavior is normal. Judgment and thought content normal.         CRANIAL NERVES     CN III, IV, VI   Pupils are equal, round, and reactive to light.  Extraocular motions are normal.        Significant Labs:   Recent Lab Results       02/06/19  0741        Troponin I 0.06         All pertinent labs within the past 24 hours have been reviewed.    Significant Imaging: I have reviewed and interpreted all pertinent imaging results/findings within the past 24 hours.

## 2019-02-06 NOTE — PLAN OF CARE
Problem: Adult Inpatient Plan of Care  Goal: Readiness for Transition of Care    Intervention: Mutually Develop Transition Plan   02/06/19 1050 02/06/19 1119   OTHER   Communicated expected length of stay with patient/caregiver yes --    Is patient able to care for self after discharge? No --    Who are your caregiver(s) and their phone number(s)? Tiffany marinelli [phone] --    Patient currently receives home health services? --  Yes   (RETIRED) Discharge Needs Assessment   How many people do you have in your home that can help with your care after discharge? --  1   Discharge Needs Assessment   Readmission Within the Last 30 Days no previous admission in last 30 days --    Equipment Currently Used at Home bedside commode;walker, rolling;other (see comments)  (walking sticks) --    Transportation Anticipated family or friend will provide --    Social Work Plan   Patient/Family in Agreement with Plan yes --    Living Environment   Able to Return to Prior Arrangements yes --    (RETIRED) Current Health   Expected Length of Stay (days) 2 --    (RETIRED) Social Work Plan   Patient's perception of discharge disposition home health --

## 2019-02-06 NOTE — PLAN OF CARE
02/06/19 1049   DIAZ Message   Medicare Outpatient and Observation Notification regarding financial responsibility Given to patient/caregiver;Explained to patient/caregiver;Signed/date by patient/caregiver   Date DIAZ was signed 02/06/19   Time DIAZ was signed 0900   DIAZ signed by daughter.

## 2019-02-06 NOTE — PLAN OF CARE
02/06/19 1311   Discharge Reassessment   Assessment Type Discharge Planning Reassessment   Anticipated Discharge Disposition Home-Health   Provided patient/caregiver education on the expected discharge date and the discharge plan Yes   Do you have any problems affording any of your prescribed medications? No   Discharge Plan A Home Health   DME Needed Upon Discharge  none   Patient choice form signed by patient/caregiver Yes   Post-Acute Status   Post-Acute Authorization Home Health/Hospice   Answered questions about applying for Medicaid that patient's daughter had. States would like to have her mom get [facility]. Preference form signed. Denies any other discharge needs at this time.

## 2019-02-06 NOTE — H&P
[facility] Medicine  History & Physical    Patient Name: Tatiana Thao  MRN: [id number]  Admission Date: 2/6/2019  Attending Physician: Huber Henry MD  Primary Care Provider: Roc Ley MD         Patient information was obtained from patient and ER records.     Subjective:     Principal Problem:Syncope    Chief Complaint: No chief complaint on file.       HPI: This is a 89-year-old female who presented to the emergency department in picking you last evening after having an episode at home that she passed out and was sitting at the table and found by family.  There was no fall or injury noted. Family members say she just slumped over at the table.  Patient was transferred here due to overflow status at the tertiary care centers.  Accept this patient in transfer and she has been stable since transfer and admission.  Family states that this patient is demented at baseline but gets around the house and does her activities of daily living without problem.  Was taken to emergency department [facility] by EMS after family states that she had passed out at the table at home unwitnessed.  Patient had awoke and by the time they received arrived at the emergency department and workup there was essentially negative.  This is by history only.  At the time of my visit this patient was in no acute distress although appeared somnolent.  She was arousable to verbal and tactile stimuli.    Past Medical History:   Diagnosis Date    Asthma     CHF (congestive heart failure)     Colon polyp     COPD (chronic obstructive pulmonary disease)     Coronary artery disease     Diabetes mellitus     Hypertension        Past Surgical History:   Procedure Laterality Date    APPENDECTOMY      BACK SURGERY      BLADDER SURGERY      bladder suspension    ERCP N/A 1/7/2017    [facility] at [facility] END[facility]    HYSTERECTOMY      OOPHORECTOMY         Review of  patient's allergies indicates:   Allergen Reactions    Adhesive Other (See Comments)    Demerol [meperidine]     Dilaudid [hydromorphone] Hallucinations    Elavil [amitriptyline] Other (See Comments)    Morphine Other (See Comments)     hallucinations    Prednisone      Other reaction(s): Unknown       No current facility-administered medications on file prior to encounter.      Current Outpatient Medications on File Prior to Encounter   Medication Sig    AMLODIPINE BESYLATE (NORVASC ORAL) Take 5 mg by mouth once daily.     famotidine (PEPCID) 20 MG tablet Take 20 mg by mouth 2 (two) times daily.    furosemide (LASIX) 20 MG tablet Take 20 mg by mouth once daily. 1 Tablet Oral Every other day    ramipril (ALTACE) 5 MG capsule Take 2.5 mg by mouth after dinner.     tamsulosin (FLOMAX) 0.4 mg Cp24 Take 0.4 mg by mouth once daily.    brinzolamide (AZOPT) 1 % ophthalmic suspension Place 1 drop into both eyes 2 (two) times daily.    cholecalciferol, vitamin D3, (VITAMIN D3) 2,000 unit Cap Take 2,000 Units by mouth once daily.    ergocalciferol (VITAMIN D2) 50,000 unit Cap Take 50,000 Units by mouth every 14 (fourteen) days.    latanoprost 0.005 % ophthalmic solution Place 1 drop into both eyes every evening.    quetiapine (SEROQUEL) 100 MG Tab Take 100 mg by mouth every evening.    [DISCONTINUED] co-enzyme Q-10 30 mg capsule Take 100 mg by mouth once daily. Up to 500mg    [DISCONTINUED] docusate sodium (COLACE) 100 MG capsule Take 100 mg by mouth 2 (two) times daily.    [DISCONTINUED] FEXOFENADINE HCL (ALLEGRA ORAL) Take 180 mg by mouth once daily.     [DISCONTINUED] magnesium oxide (MAG-OX) 400 mg tablet Take 400 mg by mouth once daily.    [DISCONTINUED] melatonin 3 mg Tab Take 3 mg by mouth every evening.    [DISCONTINUED] oxybutynin (DITROPAN-XL) 5 MG TR24 Take 5 mg by mouth once daily.     Family History     Problem Relation (Age of Onset)    Diabetes Mother, Father, Sister, Brother, Daughter     Hypertension Mother, Sister, Brother, Daughter    Stroke Mother        Tobacco Use    Smoking status: Never Smoker   Substance and Sexual Activity    Alcohol use: No     Frequency: Never    Drug use: No    Sexual activity: No     Review of Systems   Constitutional: Positive for activity change. Negative for appetite change, fatigue and fever.   HENT: Negative for congestion, ear discharge, mouth sores, nosebleeds, rhinorrhea, sinus pressure, sinus pain and tinnitus.    Eyes: Negative.  Negative for pain, redness and itching.   Respiratory: Negative for apnea, cough, choking, chest tightness, shortness of breath, wheezing and stridor.    Cardiovascular: Negative for chest pain, palpitations and leg swelling.   Gastrointestinal: Negative for abdominal distention, abdominal pain, anal bleeding, blood in stool, constipation and diarrhea.   Endocrine: Negative.    Genitourinary: Negative for difficulty urinating, flank pain, frequency and urgency.   Musculoskeletal: Negative for arthralgias, back pain, gait problem and myalgias.   Skin: Negative for color change and pallor.   Allergic/Immunologic: Negative.    Neurological: Positive for dizziness, syncope, weakness and light-headedness. Negative for facial asymmetry and headaches.   Hematological: Negative for adenopathy. Does not bruise/bleed easily.   Psychiatric/Behavioral: Positive for agitation. The patient is nervous/anxious.      Objective:     Vital Signs (Most Recent):  Temp: 96.9 °F (36.1 °C) (02/06/19 1029)  Pulse: 82 (02/06/19 1029)  Resp: 18 (02/06/19 1029)  BP: 136/80 (02/06/19 1029)  SpO2: (!) 94 % (02/06/19 1029) Vital Signs (24h Range):  Temp:  [96.6 °F (35.9 °C)-97.3 °F (36.3 °C)] 96.9 °F (36.1 °C)  Pulse:  [] 82  Resp:  [16-18] 18  SpO2:  [94 %-98 %] 94 %  BP: (112-150)/(51-80) 136/80     Weight: 55.9 kg (123 lb 3.2 oz)  Body mass index is 25.75 kg/m².    Physical Exam   Constitutional: She is oriented to person, place, and time. She appears  well-developed and well-nourished.   HENT:   Head: Normocephalic and atraumatic.   Eyes: EOM are normal. Pupils are equal, round, and reactive to light.   Neck: Normal range of motion. Neck supple. No tracheal deviation present. No thyromegaly present.   Cardiovascular: Normal rate, regular rhythm and normal heart sounds.   Pulmonary/Chest: Effort normal and breath sounds normal.   Abdominal: Soft. Bowel sounds are normal. She exhibits no distension. There is no tenderness. There is no rebound and no guarding.   Musculoskeletal: Normal range of motion.   Lymphadenopathy:     She has no cervical adenopathy.   Neurological: She is alert and oriented to person, place, and time. She exhibits abnormal muscle tone. Coordination abnormal.   Skin: Skin is warm and dry. Capillary refill takes less than 2 seconds.   Psychiatric: She has a normal mood and affect. Her behavior is normal. Judgment and thought content normal.         CRANIAL NERVES     CN III, IV, VI   Pupils are equal, round, and reactive to light.  Extraocular motions are normal.        Significant Labs:   Recent Lab Results       02/06/19  0741        Troponin I 0.06         All pertinent labs within the past 24 hours have been reviewed.    Significant Imaging: I have reviewed and interpreted all pertinent imaging results/findings within the past 24 hours.    Assessment/Plan:     * Syncope    Admit for telemetry monitoring due to syncope  Continue IV fluids at 50 cc/hour normal saline  CT of the head without contrast  Repeat labs this morning to include CBC CMP magnesium phosphorus TSH  Repeat labs in the a.m. as needed  Echocardiogram today         VTE Risk Mitigation (From admission, onward)    None             Huber Henry MD  Department of Hospital Medicine   [facility] - Med Surg

## 2019-02-06 NOTE — HPI
This is a 89-year-old female who presented to the emergency department in picking you last evening after having an episode at home that she passed out and was sitting at the table and found by family.  There was no fall or injury noted. Family members say she just slumped over at the table.  Patient was transferred here due to overflow status at the tertiary care centers.  Accept this patient in transfer and she has been stable since transfer and admission.  Family states that this patient is demented at baseline but gets around the house and does her activities of daily living without problem.  Was taken to emergency department [facility] by EMS after family states that she had passed out at the table at home unwitnessed.  Patient had awoke and by the time they received arrived at the emergency department and workup there was essentially negative.  This is by history only.  At the time of my visit this patient was in no acute distress although appeared somnolent.  She was arousable to verbal and tactile stimuli.

## 2019-02-06 NOTE — ASSESSMENT & PLAN NOTE
Admit for telemetry monitoring due to syncope  Continue IV fluids at 50 cc/hour normal saline  CT of the head without contrast  Repeat labs this morning to include CBC CMP magnesium phosphorus TSH  Repeat labs in the a.m. as needed  Echocardiogram today

## 2019-02-07 LAB — TROPONIN I SERPL DL<=0.01 NG/ML-MCNC: 0.06 NG/ML

## 2019-02-07 PROCEDURE — 36415 COLL VENOUS BLD VENIPUNCTURE: CPT

## 2019-02-07 PROCEDURE — 63600175 PHARM REV CODE 636 W HCPCS: Performed by: INTERNAL MEDICINE

## 2019-02-07 PROCEDURE — 97802 MEDICAL NUTRITION INDIV IN: CPT

## 2019-02-07 PROCEDURE — C9113 INJ PANTOPRAZOLE SODIUM, VIA: HCPCS | Performed by: INTERNAL MEDICINE

## 2019-02-07 PROCEDURE — G0378 HOSPITAL OBSERVATION PER HR: HCPCS

## 2019-02-07 PROCEDURE — 84484 ASSAY OF TROPONIN QUANT: CPT

## 2019-02-07 PROCEDURE — 25000003 PHARM REV CODE 250: Performed by: INTERNAL MEDICINE

## 2019-02-07 RX ORDER — ALOGLIPTIN 25 MG/1
25 TABLET, FILM COATED ORAL DAILY
COMMUNITY

## 2019-02-07 RX ORDER — ASPIRIN 81 MG/1
81 TABLET ORAL DAILY
COMMUNITY

## 2019-02-07 RX ORDER — GABAPENTIN 400 MG/1
400 CAPSULE ORAL 2 TIMES DAILY
Status: ON HOLD | COMMUNITY
End: 2019-02-08 | Stop reason: HOSPADM

## 2019-02-07 RX ORDER — CLONIDINE HYDROCHLORIDE 0.1 MG/1
0.1 TABLET ORAL ONCE
Status: COMPLETED | OUTPATIENT
Start: 2019-02-08 | End: 2019-02-07

## 2019-02-07 RX ORDER — OLANZAPINE 5 MG/1
10 TABLET, ORALLY DISINTEGRATING ORAL ONCE
Status: COMPLETED | OUTPATIENT
Start: 2019-02-07 | End: 2019-02-07

## 2019-02-07 RX ORDER — FLUTICASONE PROPIONATE 50 MCG
1 SPRAY, SUSPENSION (ML) NASAL DAILY
Status: ON HOLD | COMMUNITY
End: 2019-02-08 | Stop reason: HOSPADM

## 2019-02-07 RX ORDER — SODIUM CHLORIDE 9 MG/ML
INJECTION, SOLUTION INTRAVENOUS CONTINUOUS
Status: DISCONTINUED | OUTPATIENT
Start: 2019-02-07 | End: 2019-02-08 | Stop reason: HOSPADM

## 2019-02-07 RX ORDER — LORAZEPAM 2 MG/ML
1 INJECTION INTRAMUSCULAR 2 TIMES DAILY PRN
Status: DISCONTINUED | OUTPATIENT
Start: 2019-02-07 | End: 2019-02-08 | Stop reason: HOSPADM

## 2019-02-07 RX ORDER — METOPROLOL SUCCINATE 25 MG/1
25 TABLET, EXTENDED RELEASE ORAL DAILY
COMMUNITY

## 2019-02-07 RX ORDER — QUETIAPINE FUMARATE 25 MG/1
50 TABLET, FILM COATED ORAL DAILY
Status: ON HOLD | COMMUNITY
End: 2019-02-08 | Stop reason: HOSPADM

## 2019-02-07 RX ORDER — CLONIDINE HYDROCHLORIDE 0.1 MG/1
TABLET ORAL
Status: COMPLETED
Start: 2019-02-07 | End: 2019-02-07

## 2019-02-07 RX ORDER — SPIRONOLACTONE 25 MG/1
25 TABLET ORAL DAILY
COMMUNITY

## 2019-02-07 RX ORDER — OLANZAPINE 5 MG/1
10 TABLET, ORALLY DISINTEGRATING ORAL NIGHTLY
Status: DISCONTINUED | OUTPATIENT
Start: 2019-02-07 | End: 2019-02-08

## 2019-02-07 RX ADMIN — LORAZEPAM 1 MG: 2 INJECTION, SOLUTION INTRAMUSCULAR; INTRAVENOUS at 01:02

## 2019-02-07 RX ADMIN — SITAGLIPTIN 100 MG: 25 TABLET, FILM COATED ORAL at 09:02

## 2019-02-07 RX ADMIN — BRINZOLAMIDE 1 DROP: 10 SUSPENSION/ DROPS OPHTHALMIC at 09:02

## 2019-02-07 RX ADMIN — LISINOPRIL 2.5 MG: 2.5 TABLET ORAL at 09:02

## 2019-02-07 RX ADMIN — GABAPENTIN 400 MG: 100 CAPSULE ORAL at 08:02

## 2019-02-07 RX ADMIN — OLANZAPINE 10 MG: 5 TABLET, ORALLY DISINTEGRATING ORAL at 02:02

## 2019-02-07 RX ADMIN — FAMOTIDINE 20 MG: 20 TABLET ORAL at 09:02

## 2019-02-07 RX ADMIN — GABAPENTIN 400 MG: 100 CAPSULE ORAL at 09:02

## 2019-02-07 RX ADMIN — METOPROLOL SUCCINATE 25 MG: 25 TABLET, EXTENDED RELEASE ORAL at 09:02

## 2019-02-07 RX ADMIN — APIXABAN 2.5 MG: 2.5 TABLET, FILM COATED ORAL at 09:02

## 2019-02-07 RX ADMIN — CLONIDINE HYDROCHLORIDE 0.1 MG: 0.1 TABLET ORAL at 10:02

## 2019-02-07 RX ADMIN — LATANOPROST 1 DROP: 50 SOLUTION OPHTHALMIC at 09:02

## 2019-02-07 RX ADMIN — CHOLECALCIFEROL (VITAMIN D3) 10 MCG (400 UNIT) TABLET 2000 UNITS: at 09:02

## 2019-02-07 RX ADMIN — ASPIRIN 81 MG: 81 TABLET, COATED ORAL at 09:02

## 2019-02-07 RX ADMIN — FAMOTIDINE 20 MG: 20 TABLET ORAL at 08:02

## 2019-02-07 RX ADMIN — AMLODIPINE BESYLATE 5 MG: 2.5 TABLET ORAL at 09:02

## 2019-02-07 RX ADMIN — FUROSEMIDE 20 MG: 20 TABLET ORAL at 09:02

## 2019-02-07 RX ADMIN — SODIUM CHLORIDE 1000 ML: 9 INJECTION, SOLUTION INTRAVENOUS at 07:02

## 2019-02-07 RX ADMIN — TAMSULOSIN HYDROCHLORIDE 0.4 MG: 0.4 CAPSULE ORAL at 09:02

## 2019-02-07 RX ADMIN — APIXABAN 2.5 MG: 2.5 TABLET, FILM COATED ORAL at 08:02

## 2019-02-07 RX ADMIN — OLANZAPINE 10 MG: 5 TABLET, ORALLY DISINTEGRATING ORAL at 08:02

## 2019-02-07 RX ADMIN — BRINZOLAMIDE 1 DROP: 10 SUSPENSION/ DROPS OPHTHALMIC at 08:02

## 2019-02-07 RX ADMIN — PANTOPRAZOLE SODIUM 40 MG: 40 INJECTION, POWDER, LYOPHILIZED, FOR SOLUTION INTRAVENOUS at 09:02

## 2019-02-07 RX ADMIN — CALCITRIOL 0.25 MCG: 0.25 CAPSULE, LIQUID FILLED ORAL at 09:02

## 2019-02-07 NOTE — CONSULTS
"  [facility] - Med Surg  Adult Nutrition  Consult Note    SUMMARY     Recommendations    Recommendation/Intervention: (1) Provide 1800 sylvester ADA diet  2) Provide fluids of choice on meal trays  3)Provide Boost when pt begins to eat. 4) Suggest Clinimix if patient  does not begin to eat in the next 48 hrs.)  Goals: (1) Pt will consume >75% of meals  2) Pt will consume recommended fluid)  Nutrition Goal Status: new    Reason for Assessment    Reason For Assessment: consult  Diagnosis: (Syncope)  General Information Comments:     2019 Pt unable to answer questions. Son in room. Son states that he has been unable to get pt to eat anything.. States that pt has lost about 21 lbs since Anast[place]. Her Alzheimers has suddenly progressed rapidly. Will provide boost when pt begins to eat. Consider Clinimix if pt does not begin to eat in the next 48 hrs.    Past Medical History:   Diagnosis Date    Asthma     CHF (congestive heart failure)     Colon polyp     COPD (chronic obstructive pulmonary disease)     Coronary artery disease     Diabetes mellitus     Hypertension      Nutrition Risk Screen    Nutrition Risk Screen: no indicators present    Nutrition/Diet History    Patient Reported Diet/Restrictions/Preferences: soft  Spiritual, Cultural Beliefs, Mandaeism Practices, Values that Affect Care: yes  Food Allergies: NKFA  Factors Affecting Nutritional Intake: impaired cognitive status/motor control, inability to feed self    Anthropometrics    Temp: 96.9 °F (36.1 °C)  Height Method: Stated  Height: 4' 10" (147.3 cm)  Height (inches): 58 in  Weight Method: Bed Scale  Weight: 55.9 kg (123 lb 3.2 oz)  Weight (lb): 123.2 lb  Ideal Body Weight (IBW), Female: 90 lb  % Ideal Body Weight, Female (lb): 136.89 lb  BMI (Calculated): 25.8  Weight Loss: unintentional  Usual Body Weight (UBW), k.4 kg  Weight Change Amount: 21 lb  % Usual Body Weight: 85.63     Lab/Procedures/Meds    Pertinent Labs " Reviewed: reviewed  Lab Results   Component Value Date    ALBUMIN 3.5 02/06/2019     Lab Results   Component Value Date    WBC 4.75 02/06/2019    HGB 11.5 (L) 02/06/2019    HCT 35.3 (L) 02/06/2019    MCV 88 02/06/2019     02/06/2019     Physical Findings/Assessment    Pt appears thin  Up until this fainting spell pt was able to ambulate at home per son.    Estimated/Assessed Needs    Weight Used For Calorie Calculations: 55.9 kg (123 lb 3 oz)  Energy Calorie Requirements (kcal): 0056-1016 calories  (25-30 kcal/kg)  Energy Need Method: Kcal/kg  Protein Requirements: 45-56 gms  (.8-1.0)  Weight Used For Protein Calculations: 55.9 kg (123 lb 3.8 oz)     Estimated Fluid Requirement Method: RDA Method  RDA Method (mL): 1397      Nutrition Prescription Ordered    Current Diet Order: 1800 ADA Soft foods will be included on tray. Jello available on floor.  Nutrition Order Comments: (Pt will not eat at this time. Will send boost when she starts eating.)    Evaluation of Received Nutrient/Fluid Intake    Energy Calories Required: not meeting needs  Protein Required: not meeting needs  Fluid Required: not meeting needs  Total Fluid Intake (mL/kg): (1078 mls)  % Intake of Estimated Energy Needs:   % Meal Intake: Nothing    Nutrition Risk    Level of Risk/Frequency of Follow-up: moderate - high     Assessment and Plan    Nutrition Problem  Inadequate Intake of meals    Related to (etiology):   Acuity of illness    Signs and Symptoms (as evidenced by):   Refuses to eat. Will not use straw per son.     Interventions/Recommendations (treatment strategy):  Provide regular diet and boost when pt begins to eat.    Nutrition Diagnosis Status:   continuing     Monitor and Evaluation    Food and Nutrient Intake: energy intake  Food and Nutrient Adminstration: diet order  Anthropometric Measurements: weight  Biochemical Data, Medical Tests and Procedures: glucose/endocrine profile  Nutrition-Focused Physical Findings: overall  appearance     Malnutrition Assessment  Malnutrition Type: chronic illness  Energy Intake: severe energy intake     Nutrition Follow-Up    Yes

## 2019-02-07 NOTE — PLAN OF CARE
02/07/19 0830   Discharge Reassessment   Assessment Type Discharge Planning Reassessment   Anticipated Discharge Disposition Long Term   Discharge Plan A New Nursing Home placement - alf care facility   Post-Acute Status   Post-Acute Authorization Placement   Post-Acute Placement Status Referrals Sent   Spoke to patient's two sons who state that whatever Tiffany wants to do is fine with them. Called Tiffany who states would like her to go to a nursing home as they can't take care of her. [facility] would like the ones in [place] & [facility] called. Called all & [facility] in [place] [facility] [place] has beds. States to send her information to both of them. The others did not have beds at this time.

## 2019-02-07 NOTE — NURSING
PT. VERY RESTLESS, PULLING AT IV LINES, ATTEMPTING TO GET OUT OF BED. SON STATES PT. WAS RECENTLY PRESCRIBED XANAX 1MG, REQUESTS THAT MD BE CALLED ABOUT MED. DR. DING NOTIFIED, NEW ORDERS RECEIVED.

## 2019-02-08 VITALS
HEART RATE: 60 BPM | SYSTOLIC BLOOD PRESSURE: 220 MMHG | TEMPERATURE: 98 F | BODY MASS INDEX: 25.86 KG/M2 | DIASTOLIC BLOOD PRESSURE: 90 MMHG | HEIGHT: 58 IN | WEIGHT: 123.19 LBS | RESPIRATION RATE: 16 BRPM | OXYGEN SATURATION: 99 %

## 2019-02-08 LAB
ANION GAP SERPL CALC-SCNC: 17 MMOL/L
AORTIC VALVE CUSP SEPERATION: 1 CM
AV PEAK GRADIENT: 27.04 MMHG
AV VELOCITY RATIO: 0.42
BASOPHILS # BLD AUTO: 0.02 K/UL
BASOPHILS NFR BLD: 0.2 %
BSA FOR ECHO PROCEDURE: 1.51 M2
BUN SERPL-MCNC: 56 MG/DL
CALCIUM SERPL-MCNC: 10.4 MG/DL
CHLORIDE SERPL-SCNC: 108 MMOL/L
CO2 SERPL-SCNC: 18 MMOL/L
CREAT SERPL-MCNC: 1.5 MG/DL
CV ECHO LV RWT: 0.63 CM
DIFFERENTIAL METHOD: ABNORMAL
DOP CALC AO PEAK VEL: 2.6 M/S
DOP CALC LVOT AREA: 3.14 CM2
DOP CALC LVOT DIAMETER: 2 CM
DOP CALC LVOT PEAK VEL: 1.1 M/S
E/A RATIO: 0.59
ECHO LV POSTERIOR WALL: 1.3 CM (ref 0.6–1.1)
EOSINOPHIL # BLD AUTO: 0 K/UL
EOSINOPHIL NFR BLD: 0 %
ERYTHROCYTE [DISTWIDTH] IN BLOOD BY AUTOMATED COUNT: 13.3 %
EST. GFR  (AFRICAN AMERICAN): 35.1 ML/MIN/1.73 M^2
EST. GFR  (NON AFRICAN AMERICAN): 30.5 ML/MIN/1.73 M^2
FRACTIONAL SHORTENING: 24 % (ref 28–44)
GLUCOSE SERPL-MCNC: 245 MG/DL
HCT VFR BLD AUTO: 38.3 %
HGB BLD-MCNC: 12.2 G/DL
IMM GRANULOCYTES # BLD AUTO: 0.03 K/UL
IMM GRANULOCYTES NFR BLD AUTO: 0.3 %
INTERVENTRICULAR SEPTUM: 1.4 CM (ref 0.6–1.1)
LEFT ATRIUM SIZE: 3.7 CM
LEFT INTERNAL DIMENSION IN SYSTOLE: 3.1 CM (ref 2.1–4)
LEFT VENTRICLE MASS INDEX: 138.2 G/M2
LEFT VENTRICULAR INTERNAL DIMENSION IN DIASTOLE: 4.1 CM (ref 3.5–6)
LEFT VENTRICULAR MASS: 204.87 G
LYMPHOCYTES # BLD AUTO: 0.5 K/UL
LYMPHOCYTES NFR BLD: 4.8 %
MCH RBC QN AUTO: 28.2 PG
MCHC RBC AUTO-ENTMCNC: 31.9 G/DL
MCV RBC AUTO: 89 FL
MONOCYTES # BLD AUTO: 0.9 K/UL
MONOCYTES NFR BLD: 8.7 %
MV PEAK A VEL: 1.73 M/S
MV PEAK E VEL: 1.02 M/S
MV PEAK GRADIENT: 82 MMHG
MV STENOSIS PRESSURE HALF TIME: 100 MS
MV VALVE AREA P 1/2 METHOD: 2.2 CM2
NEUTROPHILS # BLD AUTO: 8.9 K/UL
NEUTROPHILS NFR BLD: 86 %
NRBC BLD-RTO: 0 /100 WBC
PISA TR MAX VEL: 2.84 M/S
PLATELET # BLD AUTO: 186 K/UL
PMV BLD AUTO: 11.4 FL
POTASSIUM SERPL-SCNC: 4.3 MMOL/L
PV PEAK VELOCITY: 1.26 CM/S
RA PRESSURE: 3 MMHG
RBC # BLD AUTO: 4.32 M/UL
RIGHT VENTRICULAR END-DIASTOLIC DIMENSION: 3.4 CM
SODIUM SERPL-SCNC: 143 MMOL/L
TR MAX PG: 32.26 MMHG
TV REST PULMONARY ARTERY PRESSURE: 35 MMHG
WBC # BLD AUTO: 10.3 K/UL

## 2019-02-08 PROCEDURE — 94761 N-INVAS EAR/PLS OXIMETRY MLT: CPT

## 2019-02-08 PROCEDURE — C9113 INJ PANTOPRAZOLE SODIUM, VIA: HCPCS | Performed by: INTERNAL MEDICINE

## 2019-02-08 PROCEDURE — 36415 COLL VENOUS BLD VENIPUNCTURE: CPT

## 2019-02-08 PROCEDURE — 94640 AIRWAY INHALATION TREATMENT: CPT

## 2019-02-08 PROCEDURE — 25000003 PHARM REV CODE 250

## 2019-02-08 PROCEDURE — 63600175 PHARM REV CODE 636 W HCPCS: Performed by: INTERNAL MEDICINE

## 2019-02-08 PROCEDURE — 80048 BASIC METABOLIC PNL TOTAL CA: CPT

## 2019-02-08 PROCEDURE — G0378 HOSPITAL OBSERVATION PER HR: HCPCS

## 2019-02-08 PROCEDURE — 85025 COMPLETE CBC W/AUTO DIFF WBC: CPT

## 2019-02-08 PROCEDURE — 25000003 PHARM REV CODE 250: Performed by: INTERNAL MEDICINE

## 2019-02-08 PROCEDURE — 86580 TB INTRADERMAL TEST: CPT | Performed by: INTERNAL MEDICINE

## 2019-02-08 RX ORDER — PANTOPRAZOLE SODIUM 40 MG/10ML
40 INJECTION, POWDER, LYOPHILIZED, FOR SOLUTION INTRAVENOUS DAILY
Qty: 1200 MG | Refills: 11 | Status: SHIPPED | OUTPATIENT
Start: 2019-02-08 | End: 2020-02-08

## 2019-02-08 RX ORDER — CLONIDINE HYDROCHLORIDE 0.1 MG/1
TABLET ORAL
Status: COMPLETED
Start: 2019-02-08 | End: 2019-02-08

## 2019-02-08 RX ORDER — AMLODIPINE BESYLATE 2.5 MG/1
10 TABLET ORAL DAILY
Status: DISCONTINUED | OUTPATIENT
Start: 2019-02-08 | End: 2019-02-08 | Stop reason: HOSPADM

## 2019-02-08 RX ORDER — HYDRALAZINE HYDROCHLORIDE 25 MG/1
100 TABLET, FILM COATED ORAL EVERY 8 HOURS
Status: DISCONTINUED | OUTPATIENT
Start: 2019-02-08 | End: 2019-02-08 | Stop reason: HOSPADM

## 2019-02-08 RX ORDER — CLONIDINE HYDROCHLORIDE 0.1 MG/1
0.2 TABLET ORAL EVERY 4 HOURS PRN
Status: DISCONTINUED | OUTPATIENT
Start: 2019-02-08 | End: 2019-02-08

## 2019-02-08 RX ORDER — CLONIDINE HYDROCHLORIDE 0.1 MG/1
0.2 TABLET ORAL ONCE
Status: COMPLETED | OUTPATIENT
Start: 2019-02-08 | End: 2019-02-08

## 2019-02-08 RX ORDER — CLONIDINE HYDROCHLORIDE 0.1 MG/1
0.2 TABLET ORAL
Status: DISCONTINUED | OUTPATIENT
Start: 2019-02-08 | End: 2019-02-08 | Stop reason: HOSPADM

## 2019-02-08 RX ORDER — OLANZAPINE 5 MG/1
5 TABLET, ORALLY DISINTEGRATING ORAL NIGHTLY
Status: DISCONTINUED | OUTPATIENT
Start: 2019-02-08 | End: 2019-02-08 | Stop reason: HOSPADM

## 2019-02-08 RX ORDER — LISINOPRIL 2.5 MG/1
2.5 TABLET ORAL DAILY
Qty: 90 TABLET | Refills: 3 | Status: SHIPPED | OUTPATIENT
Start: 2019-02-08 | End: 2020-02-08

## 2019-02-08 RX ORDER — CLONIDINE HYDROCHLORIDE 0.1 MG/1
0.2 TABLET ORAL EVERY 4 HOURS PRN
Qty: 60 TABLET | Refills: 0 | Status: SHIPPED | OUTPATIENT
Start: 2019-02-08 | End: 2020-02-08

## 2019-02-08 RX ORDER — OLANZAPINE 5 MG/1
5 TABLET, ORALLY DISINTEGRATING ORAL NIGHTLY
Qty: 30 TABLET | Refills: 11 | Status: SHIPPED | OUTPATIENT
Start: 2019-02-08 | End: 2020-02-08

## 2019-02-08 RX ORDER — HYDRALAZINE HYDROCHLORIDE 25 MG/1
TABLET, FILM COATED ORAL
Status: DISCONTINUED
Start: 2019-02-08 | End: 2019-02-08 | Stop reason: HOSPADM

## 2019-02-08 RX ADMIN — CLONIDINE HYDROCHLORIDE 0.2 MG: 0.1 TABLET ORAL at 03:02

## 2019-02-08 RX ADMIN — SODIUM CHLORIDE: 9 INJECTION, SOLUTION INTRAVENOUS at 04:02

## 2019-02-08 RX ADMIN — SODIUM CHLORIDE: 9 INJECTION, SOLUTION INTRAVENOUS at 12:02

## 2019-02-08 RX ADMIN — CLONIDINE HYDROCHLORIDE 0.2 MG: 0.1 TABLET ORAL at 01:02

## 2019-02-08 RX ADMIN — CLONIDINE HYDROCHLORIDE 0.2 MG: 0.1 TABLET ORAL at 04:02

## 2019-02-08 RX ADMIN — TUBERCULIN PURIFIED PROTEIN DERIVATIVE 5 UNITS: 5 INJECTION, SOLUTION INTRADERMAL at 10:02

## 2019-02-08 RX ADMIN — CLONIDINE HYDROCHLORIDE 0.2 MG: 0.1 TABLET ORAL at 05:02

## 2019-02-08 RX ADMIN — NITROGLYCERIN 0.5 INCH: 20 OINTMENT TOPICAL at 01:02

## 2019-02-08 RX ADMIN — HYDRALAZINE HYDROCHLORIDE 100 MG: 25 TABLET, FILM COATED ORAL at 05:02

## 2019-02-08 RX ADMIN — PANTOPRAZOLE SODIUM 40 MG: 40 INJECTION, POWDER, LYOPHILIZED, FOR SOLUTION INTRAVENOUS at 12:02

## 2019-02-08 RX ADMIN — LISINOPRIL 2.5 MG: 2.5 TABLET ORAL at 05:02

## 2019-02-08 RX ADMIN — NITROGLYCERIN 1 INCH: 20 OINTMENT TOPICAL at 05:02

## 2019-02-08 RX ADMIN — BRINZOLAMIDE 1 DROP: 10 SUSPENSION/ DROPS OPHTHALMIC at 12:02

## 2019-02-08 NOTE — SUBJECTIVE & OBJECTIVE
Interval History:  Patient medically is stable however she is having significant problems with agitation vital signs however stable patient is afebrile and no other significant findings    Review of Systems   Constitutional: Negative for activity change, appetite change, fatigue and fever.   HENT: Negative for congestion, ear discharge, mouth sores, nosebleeds, rhinorrhea, sinus pressure, sinus pain and tinnitus.    Eyes: Negative.  Negative for pain, redness and itching.   Respiratory: Negative for apnea, cough, choking, chest tightness, shortness of breath, wheezing and stridor.    Cardiovascular: Negative for chest pain, palpitations and leg swelling.   Gastrointestinal: Negative for abdominal distention, abdominal pain, anal bleeding, blood in stool, constipation and diarrhea.   Endocrine: Negative.    Genitourinary: Negative for difficulty urinating, flank pain, frequency and urgency.   Musculoskeletal: Negative for arthralgias, back pain, gait problem and myalgias.   Skin: Negative for color change and pallor.   Allergic/Immunologic: Negative.    Neurological: Negative for dizziness, facial asymmetry, weakness, light-headedness and headaches.   Hematological: Negative for adenopathy. Does not bruise/bleed easily.   Psychiatric/Behavioral: Positive for agitation and confusion. The patient is nervous/anxious.      Objective:     Vital Signs (Most Recent):  Temp: 96.9 °F (36.1 °C) (02/07/19 1429)  Pulse: 78 (02/07/19 1429)  Resp: 18 (02/07/19 1429)  BP: 138/80 (02/07/19 1429)  SpO2: 95 % (02/07/19 1429) Vital Signs (24h Range):  Temp:  [96 °F (35.6 °C)-96.9 °F (36.1 °C)] 96.9 °F (36.1 °C)  Pulse:  [] 78  Resp:  [16-20] 18  SpO2:  [95 %-96 %] 95 %  BP: (138-148)/(75-86) 138/80     Weight: 55.9 kg (123 lb 3.2 oz)  Body mass index is 25.75 kg/m².    Intake/Output Summary (Last 24 hours) at 2/7/2019 1925  Last data filed at 2/7/2019 1700  Gross per 24 hour   Intake 1400.83 ml   Output --   Net 1400.83 ml       Physical Exam   Constitutional: She is oriented to person, place, and time. She appears well-developed and well-nourished.   HENT:   Head: Normocephalic and atraumatic.   Eyes: EOM are normal. Pupils are equal, round, and reactive to light.   Neck: Normal range of motion. Neck supple. No tracheal deviation present. No thyromegaly present.   Cardiovascular: Normal rate, regular rhythm, normal heart sounds and intact distal pulses.   Pulmonary/Chest: Effort normal and breath sounds normal.   Abdominal: Soft. Bowel sounds are normal. She exhibits no distension. There is no tenderness. There is no rebound and no guarding.   Musculoskeletal: Normal range of motion.   Lymphadenopathy:     She has no cervical adenopathy.   Neurological: She is alert and oriented to person, place, and time.   Skin: Skin is warm and dry. Capillary refill takes less than 2 seconds.       Significant Labs:   Recent Lab Results       02/07/19  0529        Troponin I 0.06         All pertinent labs within the past 24 hours have been reviewed.    Significant Imaging: I have reviewed and interpreted all pertinent imaging results/findings within the past 24 hours.

## 2019-02-08 NOTE — DISCHARGE SUMMARY
[facility] Medicine  Discharge Summary      Patient Name: Tatiana Thao  MRN: [id number]  Admission Date: 2/6/2019  Hospital Length of Stay: 0 days  Discharge Date and Time:  02/08/2019 10:58 AM  Attending Physician: Huber Henry MD   Discharging Provider: Hever Conway MD  Primary Care Provider: Roc Ley MD      HPI:   This is a 89-year-old female who presented to the emergency department in [place] [facility] last evening after having an episode at home that she passed out and was sitting at the table and found by family.  There was no fall or injury noted. Family members say she just slumped over at the table.  Patient was transferred here due to overflow status at the tertiary care centers.  Accept this patient in transfer and she has been stable since transfer and admission.  Family states that this patient is demented at baseline but gets around the house and does her activities of daily living without problem.  Was taken to emergency department [facility] by EMS after family states that she had passed out at the table at home unwitnessed.  Patient had awoke and by the time they received arrived at the emergency department and workup there was essentially negative.  This is by history only.  At the time of my visit this patient was in no acute distress although appeared somnolent.  She was arousable to verbal and tactile stimuli.    * No surgery found *      Hospital Course:   02/07/2019:  Patient has been stable overnight however there has been some problem with agitation and anxiety.  Patient had to be medicated with Haldol yesterday evening and early this morning.  Family is asking for something for sedation so she may be able to rest.  In further discussion with family, her son, this morning he states that the family has decided to seek placement for this patient on discharge. Discharge planning has been consulted and the patient will be placed on  discharge were suitable.  Labs this morning:  Troponin level negative at 0.06 transthoracic echo is pending x-ray of the chest reveals COPD, cardiomegaly otherwise negative  02/09/2019.  Patient had increased blood pressure this morning 225 of 112.  Patient given multiple medications to decrease her blood pressure.  Blood pressure has come diet this time.  Family is gone to the [facility] in the had a bed procured at [facility].  Patient be discharged to [facility].  Long talk with  forms have been filled out to get the patient in drip foot.  All notes have been made and plan of care note is been made to get the patient in [facility].  The daughter this morning was concerned about the mother's blood pressure I assured her we get the blood pressure down prior to discharge which we have done.  The son came and he is concerned about mother possible evidence sleep apnea I discussed hospice and put the patient on hospice so she can get her trilogy machine at the nursing home.  She cannot give the CPAP machine without doing a sleep study.     Consults:   Consults (From admission, onward)        Status Ordering Provider     Inpatient consult to Registered Dietitian/Nutritionist  Once     Provider:  (Not yet assigned)    Completed RATNA DING     IP consult to case management  Once     Provider:  (Not yet assigned)    Acknowledged RATNA DING          * Syncope    Admit for telemetry monitoring due to syncope  Continue IV fluids at 50 cc/hour normal saline  CT of the head without contrast  Repeat labs this morning to include CBC CMP magnesium phosphorus TSH  Repeat labs in the a.m. as needed  Echocardiogram today    02/07/2019:  1.  Consult case management for placement.  2.  Continue current medications for urinary tract infection  3.  Add Zyprexa 5 mg at bedtime for sedation  4.  Repeat labs in the a.m.  02/09/2019.  See hospital course noted above.  The patient's blood pressures not been  controlled.  It is controlled this morning with additional medications.  The son was concerned about sleep apnea the mother has significant dementia probably can get on hospice and get trilogy machine.  The patient also is showing signs of dementia.  I have DC summary medications.  I have added Zyprexa 5 mg stool because she was on 10 yesterday which may have over sedated her.  These medicines may need to be adjusted at the nursing center by her attending physician.  Long discussion with  we got the patient a bed drip fluid and she will be discharged this morning.         Final Active Diagnoses:    Diagnosis Date Noted POA    PRINCIPAL PROBLEM:  Syncope [R55] 02/06/2019 Yes      Problems Resolved During this Admission:       Discharged Condition: fair    Disposition: Skilled Nursing Facility    Follow Up:  Follow-up Information     Roc Ley MD.    Specialty:  Internal Medicine  Contact information:  [address] [place] [id number] [phone]                 Patient Instructions:   No discharge procedures on file.    Significant Diagnostic Studies: Labs:   BMP:   Recent Labs   Lab 02/08/19 0524   *      K 4.3      CO2 18*   BUN 56*   CREATININE 1.5*   CALCIUM 10.4   , CMP   Recent Labs   Lab 02/08/19 0524      K 4.3      CO2 18*   *   BUN 56*   CREATININE 1.5*   CALCIUM 10.4   ANIONGAP 17*   ESTGFRAFRICA 35.1*   EGFRNONAA 30.5*   , CBC   Recent Labs   Lab 02/08/19 0524   WBC 10.30   HGB 12.2   HCT 38.3       and All labs within the past 24 hours have been reviewed    Pending Diagnostic Studies:     Procedure Component Value Units Date/Time    EKG 12-lead [[id number]] Collected:  02/06/19 1335    Order Status:  Sent Lab Status:  In process Updated:  02/07/19 0803    Narrative:       Test Reason : R55,    Vent. Rate : 079 BPM     Atrial Rate : 079 BPM     P-R Int : 000 ms          QRS Dur : 134 ms      QT Int : 386 ms       P-R-T Axes :  056 -88 072 degrees     QTc Int : 442 ms    Ventricular-paced rhythm  Biventricular pacemaker detected  Abnormal ECG  When compared with ECG of 07-JAN-2017 14:52,  Electronic ventricular pacemaker has replaced Sinus rhythm    Referred By: PROVIDER STEVEYSTEM           Confirmed By:          Medications:  Reconciled Home Medications:      Medication List      START taking these medications    cloNIDine 0.1 MG tablet  Commonly known as:  CATAPRES  Take 2 tablets (0.2 mg total) by mouth every 4 (four) hours as needed (SBP>160, DBP.100).     lisinopril 2.5 MG tablet  Commonly known as:  PRINIVIL,ZESTRIL  Take 1 tablet (2.5 mg total) by mouth once daily.     olanzapine zydis 5 MG Tbdl  Commonly known as:  ZyPREXA  Take 1 tablet (5 mg total) by mouth every evening.     pantoprazole 40 mg injection  Commonly known as:  PROTONIX  Inject 40 mg into the vein once daily.     SITagliptin 50 MG Tab  Commonly known as:  JANUVIA  Take 1 tablet (50 mg total) by mouth once daily.  Start taking on:  2/9/2019        CONTINUE taking these medications    alogliptin 25 mg Tab  Commonly known as:  NESINA  Take 25 mg by mouth once daily.     ANORO ELLIPTA 62.5-25 mcg/actuation Dsdv  Generic drug:  umeclidinium-vilanterol  Inhale 1 puff into the lungs once daily. Controller     aspirin 81 MG EC tablet  Commonly known as:  ECOTRIN  Take 81 mg by mouth once daily.     brinzolamide 1 % ophthalmic suspension  Commonly known as:  AZOPT  Place 1 drop into both eyes 2 (two) times daily.     famotidine 20 MG tablet  Commonly known as:  PEPCID  Take 20 mg by mouth 2 (two) times daily.     furosemide 20 MG tablet  Commonly known as:  LASIX  Take 20 mg by mouth once daily.     latanoprost 0.005 % ophthalmic solution  Place 1 drop into both eyes every evening.     metoprolol succinate 25 MG 24 hr tablet  Commonly known as:  TOPROL-XL  Take 25 mg by mouth once daily.     NORVASC ORAL  Take 5 mg by mouth once daily.     ramipril 2.5 MG capsule  Commonly  known as:  ALTACE  Take 2.5 mg by mouth after dinner.     spironolactone 25 MG tablet  Commonly known as:  ALDACTONE  Take 25 mg by mouth once daily.     VITAMIN D2 50,000 unit Cap  Generic drug:  ergocalciferol  Take 50,000 Units by mouth every 14 (fourteen) days.     VITAMIN D3 2,000 unit Cap  Generic drug:  cholecalciferol (vitamin D3)  Take 2,000 Units by mouth once daily.        STOP taking these medications    apixaban 2.5 mg Tab  Commonly known as:  ELIQUIS     fluticasone 50 mcg/actuation nasal spray  Commonly known as:  FLONASE     gabapentin 400 MG capsule  Commonly known as:  NEURONTIN     QUEtiapine 100 MG Tab  Commonly known as:  SEROQUEL     QUEtiapine 25 MG Tab  Commonly known as:  SEROQUEL     tamsulosin 0.4 mg Cap  Commonly known as:  FLOMAX            Indwelling Lines/Drains at time of discharge:   Lines/Drains/Airways          None          Time spent on the discharge of patient: 50 minutes  Patient was seen and examined on the date of discharge and determined to be suitable for discharge.         Hever Conway MD  Department of Hospital Medicine  [facility] - Med Surg

## 2019-02-08 NOTE — ASSESSMENT & PLAN NOTE
Admit for telemetry monitoring due to syncope  Continue IV fluids at 50 cc/hour normal saline  CT of the head without contrast  Repeat labs this morning to include CBC CMP magnesium phosphorus TSH  Repeat labs in the a.m. as needed  Echocardiogram today    02/07/2019:  1.  Consult case management for placement.  2.  Continue current medications for urinary tract infection  3.  Add Zyprexa 5 mg at bedtime for sedation  4.  Repeat labs in the a.m.  02/09/2019.  See hospital course noted above.  The patient's blood pressures not been controlled.  It is controlled this morning with additional medications.  The son was concerned about sleep apnea the mother has significant dementia probably can get on hospice and get trilogy machine.  The patient also is showing signs of dementia.  I have DC summary medications.  I have added Zyprexa 5 mg stool because she was on 10 yesterday which may have over sedated her.  These medicines may need to be adjusted at the nursing center by her attending physician.  Long discussion with  we got the patient a bed drip fluid and she will be discharged this morning.

## 2019-02-08 NOTE — PLAN OF CARE
Problem: Skin Injury Risk Increased  Goal: Skin Health and Integrity    Intervention: Optimize Skin Protection   02/07/19 2003   Prevent Additional Skin Injury   Head of Bed (HOB) HOB at 20-30 degrees   Pressure Reduction Techniques weight shift assistance provided   Monitor and Manage Hypervolemia   Skin Protection incontinence pads utilized

## 2019-02-08 NOTE — PLAN OF CARE
02/08/19 1343   Final Note   Assessment Type Final Discharge Note   Anticipated Discharge Disposition Long Term   What phone number can be called within the next 1-3 days to see how you are doing after discharge? [phone]   Hospital Follow Up  Appt(s) scheduled? No   Discharge plans and expectations educations in teach back method with documentation complete? Yes   Patient to go to [facility] by ambulance. Will be followed up by nursing home physician. Daughter gone to [place] to do paperwork. No other discharge needs at this time.

## 2019-02-08 NOTE — PLAN OF CARE
CC WITH DR RAZA CONCERNING PTS ELEVATED BLOOD PRESSURE/MD SAID TO GIVE PT THE CLONIDINE 0.2MG ORDERED SUBLINGUAL.

## 2019-02-08 NOTE — ASSESSMENT & PLAN NOTE
Admit for telemetry monitoring due to syncope  Continue IV fluids at 50 cc/hour normal saline  CT of the head without contrast  Repeat labs this morning to include CBC CMP magnesium phosphorus TSH  Repeat labs in the a.m. as needed  Echocardiogram today    02/07/2019:  1.  Consult case management for placement.  2.  Continue current medications for urinary tract infection  3.  Add Zyprexa 5 mg at bedtime for sedation  4.  Repeat labs in the a.m.

## 2019-02-08 NOTE — PROGRESS NOTES
[facility] Medicine  Progress Note    Patient Name: Tatiana Thao  MRN: [id number]  Patient Class: OP- Observation   Admission Date: 2/6/2019  Length of Stay: 0 days  Attending Physician: Huber Henry MD  Primary Care Provider: Roc Ley MD        Subjective:     Principal Problem:Syncope    HPI:  This is a 89-[facility]-old [facility] after having an episode at home that she passed out and was sitting at the table and found by family.  There was no fall or injury noted. Family members say she just slumped over at the table.  Patient was transferred here due to overflow status at the tertiary care centers.  Accept this patient in transfer and she has been stable since transfer and admission.  Family states that this patient is demented at baseline but gets around the house and does her activities of daily living without problem.  Was taken to emergency department [facility] by EMS after family states that she had passed out at the table at home unwitnessed.  Patient had awoke and by the time they received arrived at the emergency department and workup there was essentially negative.  This is by history only.  At the time of my visit this patient was in no acute distress although appeared somnolent.  She was arousable to verbal and tactile stimuli.    Hospital Course:  02/07/2019:  Patient has been stable overnight however there has been some problem with agitation and anxiety.  Patient had to be medicated with Haldol yesterday evening and early this morning.  Family is asking for something for sedation so she may be able to rest.  In further discussion with family, her son, this morning he states that the family has decided to seek placement for this patient on discharge. Discharge planning has been consulted and the patient will be placed on discharge were suitable.  Labs this morning:  Troponin  level negative at 0.06 transthoracic echo is pending x-ray of the chest reveals COPD, cardiomegaly otherwise negative    Interval History:  Patient medically is stable however she is having significant problems with agitation vital signs however stable patient is afebrile and no other significant findings    Review of Systems   Constitutional: Negative for activity change, appetite change, fatigue and fever.   HENT: Negative for congestion, ear discharge, mouth sores, nosebleeds, rhinorrhea, sinus pressure, sinus pain and tinnitus.    Eyes: Negative.  Negative for pain, redness and itching.   Respiratory: Negative for apnea, cough, choking, chest tightness, shortness of breath, wheezing and stridor.    Cardiovascular: Negative for chest pain, palpitations and leg swelling.   Gastrointestinal: Negative for abdominal distention, abdominal pain, anal bleeding, blood in stool, constipation and diarrhea.   Endocrine: Negative.    Genitourinary: Negative for difficulty urinating, flank pain, frequency and urgency.   Musculoskeletal: Negative for arthralgias, back pain, gait problem and myalgias.   Skin: Negative for color change and pallor.   Allergic/Immunologic: Negative.    Neurological: Negative for dizziness, facial asymmetry, weakness, light-headedness and headaches.   Hematological: Negative for adenopathy. Does not bruise/bleed easily.   Psychiatric/Behavioral: Positive for agitation and confusion. The patient is nervous/anxious.      Objective:     Vital Signs (Most Recent):  Temp: 96.9 °F (36.1 °C) (02/07/19 1429)  Pulse: 78 (02/07/19 1429)  Resp: 18 (02/07/19 1429)  BP: 138/80 (02/07/19 1429)  SpO2: 95 % (02/07/19 1429) Vital Signs (24h Range):  Temp:  [96 °F (35.6 °C)-96.9 °F (36.1 °C)] 96.9 °F (36.1 °C)  Pulse:  [] 78  Resp:  [16-20] 18  SpO2:  [95 %-96 %] 95 %  BP: (138-148)/(75-86) 138/80     Weight: 55.9 kg (123 lb 3.2 oz)  Body mass index is 25.75 kg/m².    Intake/Output Summary (Last 24 hours) at  2/7/2019 1925  Last data filed at 2/7/2019 1700  Gross per 24 hour   Intake 1400.83 ml   Output --   Net 1400.83 ml      Physical Exam   Constitutional: She is oriented to person, place, and time. She appears well-developed and well-nourished.   HENT:   Head: Normocephalic and atraumatic.   Eyes: EOM are normal. Pupils are equal, round, and reactive to light.   Neck: Normal range of motion. Neck supple. No tracheal deviation present. No thyromegaly present.   Cardiovascular: Normal rate, regular rhythm, normal heart sounds and intact distal pulses.   Pulmonary/Chest: Effort normal and breath sounds normal.   Abdominal: Soft. Bowel sounds are normal. She exhibits no distension. There is no tenderness. There is no rebound and no guarding.   Musculoskeletal: Normal range of motion.   Lymphadenopathy:     She has no cervical adenopathy.   Neurological: She is alert and oriented to person, place, and time.   Skin: Skin is warm and dry. Capillary refill takes less than 2 seconds.       Significant Labs:   Recent Lab Results       02/07/19  0529        Troponin I 0.06         All pertinent labs within the past 24 hours have been reviewed.    Significant Imaging: I have reviewed and interpreted all pertinent imaging results/findings within the past 24 hours.    Assessment/Plan:      * Syncope    Admit for telemetry monitoring due to syncope  Continue IV fluids at 50 cc/hour normal saline  CT of the head without contrast  Repeat labs this morning to include CBC CMP magnesium phosphorus TSH  Repeat labs in the a.m. as needed  Echocardiogram today    02/07/2019:  1.  Consult case management for placement.  2.  Continue current medications for urinary tract infection  3.  Add Zyprexa 5 mg at bedtime for sedation  4.  Repeat labs in the a.m.         VTE Risk Mitigation (From admission, onward)        Ordered     apixaban tablet 2.5 mg  2 times daily      02/06/19 7901              Huber Henry MD  Department of Hospital  Medicine   [facility] - Med Surg

## 2019-02-08 NOTE — PLAN OF CARE
02/08/19 1340   Discharge Reassessment   Assessment Type Discharge Planning Reassessment   Anticipated Discharge Disposition Long Term   Provided patient/caregiver education on the expected discharge date and the discharge plan Yes   Do you have any problems affording any of your prescribed medications? No   Discharge Plan A New Nursing Home placement - [facility]DC care facility   Patient choice form signed by patient/caregiver N/A   Post-Acute Status   Post-Acute Placement Status Authorization Obtained   Patient's daughter went to [facility] who stated they would have a bed next week. States she doesn't want to use them as they were rude. [facility] has a bed available & information faxed to them. Patient approved & patient's daughter going to sign paperwork. No other discharge needs at this time.

## 2019-02-08 NOTE — PLAN OF CARE
[facility]    FACILITY TRANSFER ORDERS      Patient Name: Tatiana Thao  YOB: 1929    PCP: Roc Ley MD   PCP Address: [address] / [place] [address]  PCP Phone Number: [phone]  PCP Fax: [phone]    Encounter Date: 02/08/2019    Admit to: [facility]    Vital Signs:  Routine    Diagnoses:   Active Hospital Problems    Diagnosis  POA    *Syncope [R55]  Yes      Resolved Hospital Problems   No resolved problems to display.       Allergies:  Review of patient's allergies indicates:   Allergen Reactions    Adhesive Other (See Comments)    Demerol [meperidine]     Dilaudid [hydromorphone] Hallucinations    Elavil [amitriptyline] Other (See Comments)    Morphine Other (See Comments)     hallucinations    Prednisone      Other reaction(s): Unknown       Diet: cardiac diet    Activities: Bathroom privileges with assistance    Nursing: care monitor BP      Labs: none none     CONSULTS:    Physical Therapy to evaluate and treat. , Occupational Therapy to evaluate and treat., Speech Therapy to evaluate and treat for Language, Swallowing and Cognition. and  to evaluate for community resources/long-range planning.    MISCELLANEOUS CARE:  none    WOUND CARE ORDERS  None    Medications: Review discharge medications with patient and family and provide education.      Current Discharge Medication List      START taking these medications    Details   cloNIDine (CATAPRES) 0.1 MG tablet Take 2 tablets (0.2 mg total) by mouth every 4 (four) hours as needed (SBP>160, DBP.100).  Qty: 60 tablet, Refills: 0      lisinopril (PRINIVIL,ZESTRIL) 2.5 MG tablet Take 1 tablet (2.5 mg total) by mouth once daily.  Qty: 90 tablet, Refills: 3      olanzapine zydis (ZYPREXA) 5 MG TbDL Take 1 tablet (5 mg total) by mouth every evening.  Qty: 30 tablet, Refills: 11      pantoprazole (PROTONIX) 40 mg injection Inject 40 mg into the vein once daily.  Qty: 1200 mg, Refills: 11       SITagliptin (JANUVIA) 50 MG Tab Take 1 tablet (50 mg total) by mouth once daily.  Qty: 90 tablet, Refills: 3         CONTINUE these medications which have NOT CHANGED    Details   alogliptin (NESINA) 25 mg Tab Take 25 mg by mouth once daily.      AMLODIPINE BESYLATE (NORVASC ORAL) Take 5 mg by mouth once daily.       aspirin (ECOTRIN) 81 MG EC tablet Take 81 mg by mouth once daily.      brinzolamide (AZOPT) 1 % ophthalmic suspension Place 1 drop into both eyes 2 (two) times daily.      cholecalciferol, vitamin D3, (VITAMIN D3) 2,000 unit Cap Take 2,000 Units by mouth once daily.      ergocalciferol (VITAMIN D2) 50,000 unit Cap Take 50,000 Units by mouth every 14 (fourteen) days.      famotidine (PEPCID) 20 MG tablet Take 20 mg by mouth 2 (two) times daily.      furosemide (LASIX) 20 MG tablet Take 20 mg by mouth once daily.       latanoprost 0.005 % ophthalmic solution Place 1 drop into both eyes every evening.      metoprolol succinate (TOPROL-XL) 25 MG 24 hr tablet Take 25 mg by mouth once daily.      ramipril (ALTACE) 2.5 MG capsule Take 2.5 mg by mouth after dinner.       spironolactone (ALDACTONE) 25 MG tablet Take 25 mg by mouth once daily.      umeclidinium-vilanterol (ANORO ELLIPTA) 62.5-25 mcg/actuation DsDv Inhale 1 puff into the lungs once daily. Controller         STOP taking these medications       apixaban (ELIQUIS) 2.5 mg Tab Comments:   Reason for Stopping:         fluticasone (FLONASE) 50 mcg/actuation nasal spray Comments:   Reason for Stopping:         gabapentin (NEURONTIN) 400 MG capsule Comments:   Reason for Stopping:         quetiapine (SEROQUEL) 100 MG Tab Comments:   Reason for Stopping:         QUEtiapine (SEROQUEL) 25 MG Tab Comments:   Reason for Stopping:         tamsulosin (FLOMAX) 0.4 mg Cp24 Comments:   Reason for Stopping:                    _________________________________  Hever Conway MD  02/08/2019

## 2019-02-08 NOTE — HOSPITAL COURSE
02/07/2019:  Patient has been stable overnight however there has been some problem with agitation and anxiety.  Patient had to be medicated with Haldol yesterday evening and early this morning.  Family is asking for something for sedation so she may be able to rest.  In further discussion with family, her son, this morning he states that the family has decided to seek placement for this patient on discharge. Discharge planning has been consulted and the patient will be placed on discharge were suitable.  Labs this morning:  Troponin level negative at 0.06 transthoracic echo is pending x-ray of the chest reveals COPD, cardiomegaly otherwise negative  02/09/2019.  Patient had increased blood pressure this morning 225 of 112.  Patient given multiple medications to decrease her blood pressure.  Blood pressure has come diet this time.  [facility] had a bed procured at [facility].  Patient be discharged to [facility].  Long talk with  forms have been filled out to get the patient in [facility].  All notes have been made and plan of care note is been made to get the patient in [facility].  The daughter this morning was concerned about the mother's blood pressure I assured her we get the blood pressure down prior to discharge which we have done.  The son came and he is concerned about mother possible evidence sleep apnea I discussed hospice and put the patient on hospice so she can get her trilogy machine at the nursing home.  She cannot give the CPAP machine without doing a sleep study.

## 2019-02-08 NOTE — NURSING
Report received from YVETTE Lay.  Patient in no acute distress.  Family at bedside.  Clonidine given for elevated blood pressure. DANIELLE RN

## 2019-02-09 NOTE — NURSING
Pt alert, chest rising and falling, IV discontinued.  Transferred via [facility] to [place] [place].  Family to follow transport via personal vehicle.